# Patient Record
Sex: FEMALE | Race: OTHER | HISPANIC OR LATINO | Employment: OTHER | URBAN - METROPOLITAN AREA
[De-identification: names, ages, dates, MRNs, and addresses within clinical notes are randomized per-mention and may not be internally consistent; named-entity substitution may affect disease eponyms.]

---

## 2018-10-10 ENCOUNTER — TELEPHONE (OUTPATIENT)
Dept: PAIN MEDICINE | Facility: MEDICAL CENTER | Age: 53
End: 2018-10-10

## 2018-10-10 NOTE — TELEPHONE ENCOUNTER
New pt being referred by Dr Tianna Hirsch in [place] for back pain    Pt has no seen prior pain mgt  Had an MRI done (will bring disc)  BC BS    Scheduled with Luis Armando Garcia for 10/29 and new pt pw mailed

## 2018-10-11 ENCOUNTER — OFFICE VISIT (OUTPATIENT)
Dept: URGENT CARE | Facility: MEDICAL CENTER | Age: 53
End: 2018-10-11
Payer: COMMERCIAL

## 2018-10-11 VITALS
DIASTOLIC BLOOD PRESSURE: 67 MMHG | WEIGHT: 143.25 LBS | RESPIRATION RATE: 20 BRPM | SYSTOLIC BLOOD PRESSURE: 107 MMHG | TEMPERATURE: 97.8 F | HEIGHT: 63 IN | BODY MASS INDEX: 25.38 KG/M2 | HEART RATE: 79 BPM

## 2018-10-11 DIAGNOSIS — J02.9 ACUTE PHARYNGITIS, UNSPECIFIED ETIOLOGY: Primary | ICD-10-CM

## 2018-10-11 LAB — S PYO AG THROAT QL: NEGATIVE

## 2018-10-11 PROCEDURE — 99203 OFFICE O/P NEW LOW 30 MIN: CPT | Performed by: PHYSICIAN ASSISTANT

## 2018-10-11 PROCEDURE — 87430 STREP A AG IA: CPT | Performed by: PHYSICIAN ASSISTANT

## 2018-10-11 RX ORDER — ALPRAZOLAM 0.25 MG/1
0.5 TABLET ORAL 2 TIMES DAILY
COMMUNITY
End: 2019-03-06 | Stop reason: SDUPTHER

## 2018-10-11 NOTE — PROGRESS NOTES
[facility]        NAME: Betsy Mccollum is a 52 y o  female  : 1965    MRN: [id number]  DATE: 2018  TIME: 12:17 PM    Assessment and Plan   Acute pharyngitis, unspecified etiology [J02 9]  1  Acute pharyngitis, unspecified etiology  POCT rapid strepA         Patient Instructions     1  Motrin as needed for throat pain  2  Increase fluids  3  Follow up with PCP in 3-5 days if symptoms persist     Chief Complaint     Chief Complaint   Patient presents with    Sore Throat     x2days head congestion   no fevers          History of Present Illness       The patient is a 57-year-old female presents with a 3 day history of nasal congestion, sore throat, swollen glands and headache  She denies any fever, chills or body aches since the onset of her symptoms  Review of Systems   Review of Systems   Constitutional: Negative  HENT: Positive for congestion and sore throat  Respiratory: Negative for cough  Gastrointestinal: Negative  Current Medications       Current Outpatient Prescriptions:     ALPRAZolam (XANAX) 0 25 mg tablet, Take by mouth daily at bedtime as needed for anxiety, Disp: , Rfl:     Current Allergies     Allergies as of 10/11/2018    (No Known Allergies)            The following portions of the patient's history were reviewed and updated as appropriate: allergies, current medications, past family history, past medical history, past social history, past surgical history and problem list      History reviewed  No pertinent past medical history  History reviewed  No pertinent surgical history  History reviewed  No pertinent family history  Medications have been verified  Objective   /67   Pulse 79   Temp 97 8 °F (36 6 °C) (Temporal)   Resp 20   Ht 5' 3" (1 6 m)   Wt 65 kg (143 lb 4 oz)   BMI 25 38 kg/m²        Physical Exam     Physical Exam   Constitutional: She appears well-developed and well-nourished  No distress     HENT: Head: Normocephalic and atraumatic  Right Ear: Tympanic membrane normal    Left Ear: Tympanic membrane normal    Nose: Nose normal    Mouth/Throat: Uvula is midline and mucous membranes are normal  Posterior oropharyngeal erythema present  No oropharyngeal exudate or posterior oropharyngeal edema  Cardiovascular: Normal rate, regular rhythm and normal heart sounds  No murmur heard  Pulmonary/Chest: Effort normal and breath sounds normal    Lymphadenopathy:        Head (right side): Tonsillar adenopathy present  Head (left side): Tonsillar adenopathy present  She has no cervical adenopathy

## 2018-10-11 NOTE — PATIENT INSTRUCTIONS
1  Motrin as needed for throat pain  2  Increase fluids  3   Follow up with PCP in 3-5 days if symptoms persist

## 2018-10-29 ENCOUNTER — CONSULT (OUTPATIENT)
Dept: PAIN MEDICINE | Facility: CLINIC | Age: 53
End: 2018-10-29
Payer: COMMERCIAL

## 2018-10-29 VITALS
SYSTOLIC BLOOD PRESSURE: 110 MMHG | WEIGHT: 148 LBS | HEIGHT: 63 IN | HEART RATE: 80 BPM | DIASTOLIC BLOOD PRESSURE: 68 MMHG | TEMPERATURE: 98 F | BODY MASS INDEX: 26.22 KG/M2

## 2018-10-29 DIAGNOSIS — M25.562 LEFT KNEE PAIN, UNSPECIFIED CHRONICITY: ICD-10-CM

## 2018-10-29 DIAGNOSIS — M48.062 LUMBAR STENOSIS WITH NEUROGENIC CLAUDICATION: ICD-10-CM

## 2018-10-29 DIAGNOSIS — M53.3 COCCYXDYNIA: ICD-10-CM

## 2018-10-29 DIAGNOSIS — M51.16 INTERVERTEBRAL DISC DISORDER WITH RADICULOPATHY OF LUMBAR REGION: Primary | ICD-10-CM

## 2018-10-29 PROCEDURE — 99244 OFF/OP CNSLTJ NEW/EST MOD 40: CPT | Performed by: ANESTHESIOLOGY

## 2018-10-29 NOTE — PROGRESS NOTES
Assessment:  1  Intervertebral disc disorder with radiculopathy of lumbar region    2  Lumbar stenosis with neurogenic claudication    3  Left knee pain, unspecified chronicity    4  Coccyxdynia      Plan:  The patient's symptoms, history/physical are consistent with pain that is multifactorial in origin but predominantly the result of her spinal stenosis at L4-5 which is leading to her lower back and buttock complaints  At this time, I discussed performing bilateral L4 transforaminal epidural steroid injection to help reduce swelling and inflammation which is leading to her pain symptoms  She was apprised of the most common risks and would like to proceed  She will be scheduled for an upcoming Tuesday or Thursday under fluoroscopic guidance  Once she has appropriate pain relief, she would benefit from a course of physical therapy  In addition, I will order a dedicated x-rays of the left knee to evaluate for chronic left knee pain  Complete risks and benefits including bleeding, infection, tissue reaction, nerve injury and allergic reaction were discussed  The approach was demonstrated using models and literature was provided  Verbal and written consent was obtained  My impressions and treatment recommendations were discussed in detail with the patient who verbalized understanding and had no further questions  Discharge instructions were provided  I personally saw and examined the patient and I agree with the above discussed plan of care  Orders Placed This Encounter   Procedures    FL spine and pain procedure     Standing Status:   Future     Standing Expiration Date:   10/29/2022     Order Specific Question:   Reason for Exam:     Answer:   Bilateral L4 TF ANTONIO     Order Specific Question:   Is the patient pregnant? Answer:   No     Order Specific Question:   Anticoagulant hold needed?      Answer:   No    XR knee 3 vw left non injury     Standing Status:   Future     Standing Expiration Date:   10/29/2022     Scheduling Instructions:      Bring along any outside films relating to this procedure  Order Specific Question:   Reason for Exam:     Answer:   left knee pain     Order Specific Question:   Is the patient pregnant? Answer:   No     No orders of the defined types were placed in this encounter  History of Present Illness:    Елена Avila is a 52 y o  female who presents for consultation in regards to tailbone pain, low back pain and knee pain  Symptoms have been present for many years worse over the last 2 years  Pain is reported to be moderate to severe rated 8/10 on numeric rating scale and felt nearly constantly  Symptoms are worst in the morning  Pain is described to be sharp and shooting in the lower back and travels down into the knee  Symptoms are aggravated with lying down, standing, bending, sitting, walking, exercise, relaxation  Symptoms are also aggravated by getting up from a seated position  There is no change with coughing, sneezing or bowel movements  Treatment history has included use of naproxen as needed which provided some mild relief, but caused significant stomach irritation  She has not done any physical therapy  She did try gabapentin but this did not help and caused her weight gain  I have personally reviewed and/or updated the patient's past medical history, past surgical history, family history, social history, current medications, allergies, and vital signs today  Review of Systems:    Review of Systems   Constitutional: Negative for fever and unexpected weight change  HENT: Negative for trouble swallowing  Eyes: Negative for visual disturbance  Respiratory: Negative for shortness of breath and wheezing  Cardiovascular: Negative for chest pain and palpitations  Gastrointestinal: Positive for constipation  Negative for diarrhea, nausea and vomiting     Endocrine: Negative for cold intolerance, heat intolerance and polydipsia  Genitourinary: Negative for difficulty urinating and frequency  Musculoskeletal: Positive for gait problem and joint swelling  Negative for arthralgias and myalgias  Skin: Negative for rash  Neurological: Positive for weakness  Negative for dizziness, seizures, syncope and headaches  Hematological: Does not bruise/bleed easily  Psychiatric/Behavioral: Negative for dysphoric mood  All other systems reviewed and are negative  Patient Active Problem List   Diagnosis    Intervertebral disc disorder with radiculopathy of lumbar region    Lumbar stenosis with neurogenic claudication    Coccyxdynia    Left knee pain       No past medical history on file  No past surgical history on file  No family history on file  Social History     Occupational History    Not on file  Social History Main Topics    Smoking status: Current Every Day Smoker    Smokeless tobacco: Never Used    Alcohol use Not on file    Drug use: No    Sexual activity: Not on file       Current Outpatient Prescriptions on File Prior to Visit   Medication Sig    ALPRAZolam (XANAX) 0 25 mg tablet Take by mouth daily at bedtime as needed for anxiety     No current facility-administered medications on file prior to visit  No Known Allergies    Physical Exam:    /68   Pulse 80   Temp 98 °F (36 7 °C) (Oral)   Ht 5' 3" (1 6 m)   Wt 67 1 kg (148 lb)   BMI 26 22 kg/m²     Constitutional: normal, well developed, well nourished, alert, in no distress and non-toxic and no overt pain behavior    Eyes: anicteric  HEENT: grossly intact  Neck: supple, symmetric, trachea midline and no masses   Pulmonary:even and unlabored  Cardiovascular:No edema or pitting edema present  Skin:Normal without rashes or lesions and well hydrated  Psychiatric:Mood and affect appropriate  Neurologic:Cranial Nerves II-XII grossly intact  Musculoskeletal:normal     Lumbar Spine Exam  Appearance:  Normal lordosis  Palpation/Tenderness:  left lumbar paraspinal tenderness  right lumbar paraspinal tenderness  Positive tailbone pain  Sensory:  no sensory deficits noted  Range of Motion:  Flexion:  No limitation  without pain  Extension:  Moderately limited  with pain  Lateral Flexion - Left:  Minimally limited  with pain  Lateral Flexion - Right:  Minimally limited  with pain  Rotation - Left:  No limitation  without pain  Rotation - Right:  No limitation  without pain  Motor Strength:  Left hip flexion:  5/5  Left hip extension:  5/5  Right hip flexion:  5/5  Right hip extension:  5/5  Left knee flexion:  5/5  Left knee extension:  5/5  Right knee flexion:  5/5  Right knee extension:  5/5  Left foot dorsiflexion:  5/5  Left foot plantar flexion:  5/5  Right foot dorsiflexion:  5/5  Right foot plantar flexion:  5/5  Reflexes:  Left Patellar:  2+   Right Patellar:  2+   Left Achilles:  2+   Right Achilles:  2+   Special Tests:  Left Straight Leg Test:  positive  Right Straight Leg Test:  negative  Left Eliceo's Maneuver:  negative  Right Eliceo's Maneuver:  negative    Imaging    MRI Lumbar Spine (9/7/2018)    L4-5:  Left neural foraminal annular tear  Moderate spinal stenosis at L4-5 with impingement of the L4 nerve roots    Impingement of the L5 nerve roots within the lateral recesses    MRI Coccyx (9/7/2018)    Normal

## 2018-10-31 ENCOUNTER — APPOINTMENT (OUTPATIENT)
Dept: RADIOLOGY | Facility: MEDICAL CENTER | Age: 53
End: 2018-10-31
Payer: COMMERCIAL

## 2018-10-31 ENCOUNTER — TRANSCRIBE ORDERS (OUTPATIENT)
Dept: ADMINISTRATIVE | Facility: HOSPITAL | Age: 53
End: 2018-10-31

## 2018-10-31 ENCOUNTER — APPOINTMENT (OUTPATIENT)
Dept: LAB | Facility: MEDICAL CENTER | Age: 53
End: 2018-10-31
Payer: COMMERCIAL

## 2018-10-31 DIAGNOSIS — E11.9 DIABETES MELLITUS WITHOUT COMPLICATION (HCC): ICD-10-CM

## 2018-10-31 DIAGNOSIS — E87.6 HYPOPOTASSEMIA: ICD-10-CM

## 2018-10-31 DIAGNOSIS — R68.89 MECHANICAL AND MOTOR PROBLEMS WITH INTERNAL ORGANS: Primary | ICD-10-CM

## 2018-10-31 DIAGNOSIS — K83.8 BILE DUCT PROLIFERATION: ICD-10-CM

## 2018-10-31 DIAGNOSIS — E87.1 HYPOSMOLALITY SYNDROME: ICD-10-CM

## 2018-10-31 DIAGNOSIS — E55.9 VITAMIN D DEFICIENCY: ICD-10-CM

## 2018-10-31 DIAGNOSIS — R31.9 HEMATURIA SYNDROME: ICD-10-CM

## 2018-10-31 DIAGNOSIS — E03.9 HYPOTHYROIDISM, UNSPECIFIED TYPE: ICD-10-CM

## 2018-10-31 DIAGNOSIS — M25.562 LEFT KNEE PAIN, UNSPECIFIED CHRONICITY: ICD-10-CM

## 2018-10-31 LAB
25(OH)D3 SERPL-MCNC: 16.6 NG/ML (ref 30–100)
ALT SERPL W P-5'-P-CCNC: 25 U/L (ref 12–78)
ANION GAP SERPL CALCULATED.3IONS-SCNC: 4 MMOL/L (ref 4–13)
AST SERPL W P-5'-P-CCNC: 17 U/L (ref 5–45)
BACTERIA UR QL AUTO: ABNORMAL /HPF
BILIRUB UR QL STRIP: NEGATIVE
BUN SERPL-MCNC: 14 MG/DL (ref 5–25)
CALCIUM SERPL-MCNC: 9 MG/DL (ref 8.3–10.1)
CHLORIDE SERPL-SCNC: 104 MMOL/L (ref 100–108)
CHOLEST SERPL-MCNC: 198 MG/DL (ref 50–200)
CLARITY UR: CLEAR
CO2 SERPL-SCNC: 29 MMOL/L (ref 21–32)
COLOR UR: YELLOW
CREAT SERPL-MCNC: 0.74 MG/DL (ref 0.6–1.3)
ERYTHROCYTE [DISTWIDTH] IN BLOOD BY AUTOMATED COUNT: 12.5 % (ref 11.6–15.1)
EST. AVERAGE GLUCOSE BLD GHB EST-MCNC: 103 MG/DL
GFR SERPL CREATININE-BSD FRML MDRD: 91 ML/MIN/1.73SQ M
GLUCOSE P FAST SERPL-MCNC: 74 MG/DL (ref 65–99)
GLUCOSE UR STRIP-MCNC: NEGATIVE MG/DL
HBA1C MFR BLD: 5.2 % (ref 4.2–6.3)
HCT VFR BLD AUTO: 40.6 % (ref 34.8–46.1)
HDLC SERPL-MCNC: 41 MG/DL (ref 40–60)
HGB BLD-MCNC: 13.8 G/DL (ref 11.5–15.4)
HGB UR QL STRIP.AUTO: ABNORMAL
HYALINE CASTS #/AREA URNS LPF: ABNORMAL /LPF
KETONES UR STRIP-MCNC: NEGATIVE MG/DL
LDLC SERPL CALC-MCNC: 120 MG/DL (ref 0–100)
LDLC SERPL DIRECT ASSAY-MCNC: 132 MG/DL (ref 0–100)
LEUKOCYTE ESTERASE UR QL STRIP: NEGATIVE
MCH RBC QN AUTO: 33.8 PG (ref 26.8–34.3)
MCHC RBC AUTO-ENTMCNC: 34 G/DL (ref 31.4–37.4)
MCV RBC AUTO: 100 FL (ref 82–98)
NITRITE UR QL STRIP: NEGATIVE
NON-SQ EPI CELLS URNS QL MICRO: ABNORMAL /HPF
NONHDLC SERPL-MCNC: 157 MG/DL
PH UR STRIP.AUTO: 6 [PH] (ref 4.5–8)
PLATELET # BLD AUTO: 313 THOUSANDS/UL (ref 149–390)
PMV BLD AUTO: 9.6 FL (ref 8.9–12.7)
POTASSIUM SERPL-SCNC: 4.3 MMOL/L (ref 3.5–5.3)
PROT UR STRIP-MCNC: NEGATIVE MG/DL
RBC # BLD AUTO: 4.08 MILLION/UL (ref 3.81–5.12)
RBC #/AREA URNS AUTO: ABNORMAL /HPF
SODIUM SERPL-SCNC: 137 MMOL/L (ref 136–145)
SP GR UR STRIP.AUTO: 1.02 (ref 1–1.03)
T3 SERPL-MCNC: 1.4 NG/ML (ref 0.6–1.8)
T4 SERPL-MCNC: 8.5 UG/DL (ref 4.7–13.3)
TRIGL SERPL-MCNC: 186 MG/DL
TSH SERPL DL<=0.05 MIU/L-ACNC: 1.43 UIU/ML (ref 0.36–3.74)
UROBILINOGEN UR QL STRIP.AUTO: 0.2 E.U./DL
WBC # BLD AUTO: 7.19 THOUSAND/UL (ref 4.31–10.16)
WBC #/AREA URNS AUTO: ABNORMAL /HPF

## 2018-10-31 PROCEDURE — 83721 ASSAY OF BLOOD LIPOPROTEIN: CPT | Performed by: INTERNAL MEDICINE

## 2018-10-31 PROCEDURE — 85027 COMPLETE CBC AUTOMATED: CPT | Performed by: INTERNAL MEDICINE

## 2018-10-31 PROCEDURE — 80061 LIPID PANEL: CPT | Performed by: INTERNAL MEDICINE

## 2018-10-31 PROCEDURE — 73562 X-RAY EXAM OF KNEE 3: CPT

## 2018-10-31 PROCEDURE — 84460 ALANINE AMINO (ALT) (SGPT): CPT | Performed by: INTERNAL MEDICINE

## 2018-10-31 PROCEDURE — 81001 URINALYSIS AUTO W/SCOPE: CPT | Performed by: INTERNAL MEDICINE

## 2018-10-31 PROCEDURE — 82306 VITAMIN D 25 HYDROXY: CPT | Performed by: INTERNAL MEDICINE

## 2018-10-31 PROCEDURE — 84443 ASSAY THYROID STIM HORMONE: CPT | Performed by: INTERNAL MEDICINE

## 2018-10-31 PROCEDURE — 84450 TRANSFERASE (AST) (SGOT): CPT | Performed by: INTERNAL MEDICINE

## 2018-10-31 PROCEDURE — 84436 ASSAY OF TOTAL THYROXINE: CPT | Performed by: INTERNAL MEDICINE

## 2018-10-31 PROCEDURE — 80048 BASIC METABOLIC PNL TOTAL CA: CPT | Performed by: INTERNAL MEDICINE

## 2018-10-31 PROCEDURE — 83036 HEMOGLOBIN GLYCOSYLATED A1C: CPT | Performed by: INTERNAL MEDICINE

## 2018-10-31 PROCEDURE — 36415 COLL VENOUS BLD VENIPUNCTURE: CPT | Performed by: INTERNAL MEDICINE

## 2018-10-31 PROCEDURE — 84480 ASSAY TRIIODOTHYRONINE (T3): CPT | Performed by: INTERNAL MEDICINE

## 2018-11-13 ENCOUNTER — HOSPITAL ENCOUNTER (OUTPATIENT)
Dept: RADIOLOGY | Facility: CLINIC | Age: 53
Discharge: HOME | End: 2018-11-13
Attending: ANESTHESIOLOGY | Admitting: ANESTHESIOLOGY
Payer: COMMERCIAL

## 2018-11-13 VITALS
RESPIRATION RATE: 18 BRPM | DIASTOLIC BLOOD PRESSURE: 72 MMHG | HEART RATE: 64 BPM | OXYGEN SATURATION: 98 % | TEMPERATURE: 98 F | SYSTOLIC BLOOD PRESSURE: 107 MMHG

## 2018-11-13 DIAGNOSIS — M48.062 LUMBAR STENOSIS WITH NEUROGENIC CLAUDICATION: ICD-10-CM

## 2018-11-13 PROCEDURE — 64483 NJX AA&/STRD TFRM EPI L/S 1: CPT | Performed by: ANESTHESIOLOGY

## 2018-11-13 RX ORDER — BUPIVACAINE HCL/PF 2.5 MG/ML
30 VIAL (ML) INJECTION ONCE
Status: COMPLETED | OUTPATIENT
Start: 2018-11-13 | End: 2018-11-13

## 2018-11-13 RX ORDER — 0.9 % SODIUM CHLORIDE 0.9 %
10 VIAL (ML) INJECTION ONCE
Status: COMPLETED | OUTPATIENT
Start: 2018-11-13 | End: 2018-11-13

## 2018-11-13 RX ORDER — METHYLPREDNISOLONE ACETATE 80 MG/ML
80 INJECTION, SUSPENSION INTRA-ARTICULAR; INTRALESIONAL; INTRAMUSCULAR; PARENTERAL; SOFT TISSUE ONCE
Status: COMPLETED | OUTPATIENT
Start: 2018-11-13 | End: 2018-11-13

## 2018-11-13 RX ADMIN — SODIUM CHLORIDE 4 ML: 9 INJECTION, SOLUTION INTRAMUSCULAR; INTRAVENOUS; SUBCUTANEOUS at 14:41

## 2018-11-13 RX ADMIN — Medication 4 ML: at 14:41

## 2018-11-13 RX ADMIN — METHYLPREDNISOLONE ACETATE 80 MG: 80 INJECTION, SUSPENSION INTRA-ARTICULAR; INTRALESIONAL; INTRAMUSCULAR; PARENTERAL; SOFT TISSUE at 14:43

## 2018-11-13 RX ADMIN — Medication 2 ML: at 14:43

## 2018-11-13 RX ADMIN — IOHEXOL 1 ML: 300 INJECTION, SOLUTION INTRAVENOUS at 14:43

## 2018-11-13 NOTE — DISCHARGE INSTR - LAB
Epidural Steroid Injection   WHAT YOU NEED TO KNOW:   An epidural steroid injection (ANTONIO) is a procedure to inject steroid medicine into the epidural space  The epidural space is between your spinal cord and vertebrae  Steroids reduce inflammation and fluid buildup in your spine that may be causing pain  You may be given pain medicine along with the steroids  ACTIVITY  · Do not drive or operate machinery today  · No strenuous activity today - bending, lifting, etc   · You may resume normal activites starting tomorrow - start slowly and as tolerated  · You may shower today, but no tub baths or hot tubs  · You may have numbness for several hours from the local anesthetic  Please use caution and common sense, especially with weight-bearing activities  CARE OF THE INJECTION SITE  · If you have soreness or pain, apply ice to the area today (20 minutes on/20 minutes off)  · Starting tomorrow, you may use warm, moist heat or ice if needed  · You may have an increase or change in your discomfort for 36-48 hours after your treatment  · Apply ice and continue with any pain medication you have been prescribed  · Notify [facility] if you have any of the following: redness, drainage, swelling, headache, stiff neck or fever above 100°F     SPECIAL INSTRUCTIONS  · Our office will contact you in approximately 7 days for a progress report  MEDICATIONS  · Continue to take all routine medications  · Our office may have instructed you to hold some medications  If you have a problem specifically related to your procedure, please call our office at [phone]  Problems not related to your procedure should be directed to your primary care physician

## 2018-11-13 NOTE — H&P
History of Present Illness: The patient is a 52 y o  female who presents with complaints of lower back pain secondary to spinal stenosis and is here today for bilateral L4 transforaminal epidural steroid injection  Patient Active Problem List   Diagnosis    Intervertebral disc disorder with radiculopathy of lumbar region    Lumbar stenosis with neurogenic claudication    Coccyxdynia    Left knee pain       No past medical history on file  No past surgical history on file  Current Outpatient Prescriptions:     ALPRAZolam (XANAX) 0 25 mg tablet, Take by mouth daily at bedtime as needed for anxiety, Disp: , Rfl:     No Known Allergies    Physical Exam:   Vitals:    11/13/18 1425   BP: 104/69   Pulse: 66   Resp: 20   Temp: 98 °F (36 7 °C)   SpO2: 98%     General: Awake, Alert, Oriented x 3, Mood and affect appropriate  Respiratory: Respirations even and unlabored  Cardiovascular: Peripheral pulses intact; no edema  Musculoskeletal Exam:   Lower back tenderness    ASA Score: 2    Patient/Chart Verification  Patient ID Verified: Verbal  Consents Confirmed: Procedural, To be obtained in the Pre-Procedure area  H&P( within 30 days) Verified: To be obtained in the Pre-Procedure area  Allergies Reviewed: Yes  Anticoag/NSAID held?: NA  Currently on antibiotics?: No  Pregnancy denied?: No    Assessment:   1   Lumbar stenosis with neurogenic claudication        Plan: Bilateral L4 TF ANTONIO

## 2018-11-21 ENCOUNTER — TELEPHONE (OUTPATIENT)
Dept: OBGYN CLINIC | Facility: HOSPITAL | Age: 53
End: 2018-11-21

## 2018-12-05 NOTE — TELEPHONE ENCOUNTER
Pt received CNR letter  States at least 50% relief  The pain is bearable now and she can function much better  The pain in her tailbone has eased a little bit - she was hoping for more relief in that area  Her current pain level is about a 5  Pt would like a call back to advise on next step  She can be reached at [phone]

## 2018-12-20 ENCOUNTER — HOSPITAL ENCOUNTER (OUTPATIENT)
Dept: RADIOLOGY | Facility: CLINIC | Age: 53
Discharge: HOME | End: 2018-12-20
Attending: ANESTHESIOLOGY | Admitting: ANESTHESIOLOGY
Payer: COMMERCIAL

## 2018-12-20 VITALS
RESPIRATION RATE: 20 BRPM | TEMPERATURE: 99 F | OXYGEN SATURATION: 99 % | DIASTOLIC BLOOD PRESSURE: 67 MMHG | SYSTOLIC BLOOD PRESSURE: 97 MMHG | HEART RATE: 80 BPM

## 2018-12-20 DIAGNOSIS — M53.3 COCCYGODYNIA: ICD-10-CM

## 2018-12-20 PROCEDURE — 77002 NEEDLE LOCALIZATION BY XRAY: CPT | Performed by: ANESTHESIOLOGY

## 2018-12-20 PROCEDURE — 20605 DRAIN/INJ JOINT/BURSA W/O US: CPT | Performed by: ANESTHESIOLOGY

## 2018-12-20 RX ORDER — BUPIVACAINE HCL/PF 2.5 MG/ML
30 VIAL (ML) INJECTION ONCE
Status: COMPLETED | OUTPATIENT
Start: 2018-12-20 | End: 2018-12-20

## 2018-12-20 RX ORDER — METHYLPREDNISOLONE ACETATE 80 MG/ML
80 INJECTION, SUSPENSION INTRA-ARTICULAR; INTRALESIONAL; INTRAMUSCULAR; PARENTERAL; SOFT TISSUE ONCE
Status: COMPLETED | OUTPATIENT
Start: 2018-12-20 | End: 2018-12-20

## 2018-12-20 RX ORDER — 0.9 % SODIUM CHLORIDE 0.9 %
10 VIAL (ML) INJECTION ONCE
Status: COMPLETED | OUTPATIENT
Start: 2018-12-20 | End: 2018-12-20

## 2018-12-20 RX ADMIN — Medication 3 ML: at 14:13

## 2018-12-20 RX ADMIN — SODIUM CHLORIDE 2 ML: 9 INJECTION, SOLUTION INTRAMUSCULAR; INTRAVENOUS; SUBCUTANEOUS at 14:11

## 2018-12-20 RX ADMIN — Medication 2 ML: at 14:11

## 2018-12-20 RX ADMIN — IOHEXOL 1 ML: 300 INJECTION, SOLUTION INTRAVENOUS at 14:12

## 2018-12-20 RX ADMIN — METHYLPREDNISOLONE ACETATE 80 MG: 80 INJECTION, SUSPENSION INTRA-ARTICULAR; INTRALESIONAL; INTRAMUSCULAR; PARENTERAL; SOFT TISSUE at 14:13

## 2018-12-20 NOTE — DISCHARGE INSTR - LAB
1  Do not apply heat to any area that is numb  If you have discomfort or soreness at the injection site, you may apply ice today, 20 minutes on and 20 minutes off  Tomorrow you may use ice or warm, moist heat  Do not apply ice or heat directly to the skin  2  If you experience severe shortness of breath, go to the Emergency Room  3  You may have numbness for several hours from the local anesthetic  Please use caution and common sense, especially with weight-bearing activities  4  You may have an increase or change in the discomfort for 36-48 hours after your treatment  Apply ice and continue with any pain medicine you have been prescribed  5  Do not do anything strenuous today  You may shower, but no tub baths or hot tubs today  You may resume your normal activities tomorrow, but do not overdo it  Resume normal activities slowly when you are feeling better  6  If you experience redness, drainage or swelling at the injection site, or if you develop a fever above 100 degrees, please call [facility] at [phone] or go to the Emergency Room  7  Continue to take all routine medicines prescribed by your primary care physician unless otherwise instructed by our staff  Most blood thinners should be started again according to your regularly scheduled dosing  If you have any questions, please give our office a call  If you have a problem specifically related to your procedure, please call our office at [phone]  Problems not related to your procedure should be directed to your primary care physician

## 2018-12-20 NOTE — H&P
History of Present Illness: The patient is a 52 y o  female who presents with complaints of tailbone pain secondary to coccydynia and is here today for sacrococcygeal ligament injection  Patient Active Problem List   Diagnosis    Intervertebral disc disorder with radiculopathy of lumbar region    Lumbar stenosis with neurogenic claudication    Coccyxdynia    Left knee pain       No past medical history on file  No past surgical history on file  Current Outpatient Prescriptions:     ALPRAZolam (XANAX) 0 25 mg tablet, Take by mouth daily at bedtime as needed for anxiety, Disp: , Rfl:     No Known Allergies    Physical Exam:   Vitals:    12/20/18 1357   BP: 101/67   Pulse: 82   Resp: 20   Temp: 99 °F (37 2 °C)   SpO2: 97%     General: Awake, Alert, Oriented x 3, Mood and affect appropriate  Respiratory: Respirations even and unlabored  Cardiovascular: Peripheral pulses intact; no edema  Musculoskeletal Exam:   Tailbone tenderness    ASA Score: 1    Patient/Chart Verification  Patient ID Verified: Verbal  Consents Confirmed: Procedural, To be obtained in the Pre-Procedure area  Allergies Reviewed: Yes  Anticoag/NSAID held?: NA  Currently on antibiotics?: No    Assessment:   1   Coccygodynia        Plan: Sacrococcygeal Ligament Injection

## 2018-12-27 ENCOUNTER — TELEPHONE (OUTPATIENT)
Dept: PAIN MEDICINE | Facility: CLINIC | Age: 53
End: 2018-12-27

## 2019-01-21 NOTE — TELEPHONE ENCOUNTER
S/w pt  States pain has improved since she last s/w SPA  Rates pain a 3 today and is manageable  Tailbone pain is improved and lower back pain is worse in the AM but subsides throughout the day  No f/u scheduled  When would you like to see pt back?

## 2019-02-22 ENCOUNTER — OFFICE VISIT (OUTPATIENT)
Dept: PAIN MEDICINE | Facility: CLINIC | Age: 54
End: 2019-02-22
Payer: COMMERCIAL

## 2019-02-22 VITALS
BODY MASS INDEX: 26.22 KG/M2 | HEART RATE: 85 BPM | SYSTOLIC BLOOD PRESSURE: 110 MMHG | WEIGHT: 148 LBS | DIASTOLIC BLOOD PRESSURE: 74 MMHG | TEMPERATURE: 98.2 F

## 2019-02-22 DIAGNOSIS — M48.062 LUMBAR STENOSIS WITH NEUROGENIC CLAUDICATION: ICD-10-CM

## 2019-02-22 DIAGNOSIS — M53.3 COCCYXDYNIA: ICD-10-CM

## 2019-02-22 DIAGNOSIS — M51.16 INTERVERTEBRAL DISC DISORDER WITH RADICULOPATHY OF LUMBAR REGION: Primary | ICD-10-CM

## 2019-02-22 PROCEDURE — 99214 OFFICE O/P EST MOD 30 MIN: CPT | Performed by: ANESTHESIOLOGY

## 2019-02-22 RX ORDER — MELOXICAM 15 MG/1
15 TABLET ORAL DAILY
Qty: 30 TABLET | Refills: 1 | Status: SHIPPED | OUTPATIENT
Start: 2019-02-22

## 2019-02-22 NOTE — PATIENT INSTRUCTIONS

## 2019-02-22 NOTE — PROGRESS NOTES
Assessment:  1  Intervertebral disc disorder with radiculopathy of lumbar region    2  Lumbar stenosis with neurogenic claudication    3  Coccyxdynia        Plan:  The patient has ongoing pain in lower back likely from her spinal stenosis  Overall symptoms are better from the epidural steroid injection  At this time, I will have her start meloxicam 15 mg daily to be taken in the evening with dinner time  I will also give her some exercises for core and back strengthening that should help alleviate some of the back symptoms that she experiences  She will call with an update in 2 weeks on how she is feeling  My impressions and treatment recommendations were discussed in detail with the patient who verbalized understanding and had no further questions  Discharge instructions were provided  I personally saw and examined the patient and I agree with the above discussed plan of care  No orders of the defined types were placed in this encounter  New Medications Ordered This Visit   Medications    meloxicam (MOBIC) 15 mg tablet     Sig: Take 1 tablet (15 mg total) by mouth daily     Dispense:  30 tablet     Refill:  1       History of Present Illness:  Shima Loaiza is a 62 y o  female who presents for a follow up office visit in regards to Back Pain (injection at the end of 2018  follow-up visit )  The patient has a history of lumbar disc disorder with radiculopathy and lumbar spinal stenosis and returns for follow-up  She is status post bilateral L4 transforaminal epidural steroid injection in November followed by sacrococcygeal injection in December  She reports intermittent pain in the lower back that is sharp in nature worst in the morning  I have personally reviewed and/or updated the patient's past medical history, past surgical history, family history, social history, current medications, allergies, and vital signs today  Review of Systems   Respiratory: Negative for shortness of breath  Cardiovascular: Negative for chest pain  Gastrointestinal: Positive for constipation  Negative for diarrhea, nausea and vomiting  Musculoskeletal: Positive for gait problem  Negative for arthralgias, joint swelling and myalgias  Skin: Negative for rash  Neurological: Negative for dizziness, seizures and weakness  All other systems reviewed and are negative  Patient Active Problem List   Diagnosis    Intervertebral disc disorder with radiculopathy of lumbar region    Lumbar stenosis with neurogenic claudication    Coccyxdynia    Left knee pain       No past medical history on file  No past surgical history on file  No family history on file  Social History     Occupational History    Not on file   Tobacco Use    Smoking status: Current Every Day Smoker    Smokeless tobacco: Never Used   Substance and Sexual Activity    Alcohol use: Not on file    Drug use: No    Sexual activity: Not on file       Current Outpatient Medications on File Prior to Visit   Medication Sig    ALPRAZolam (XANAX) 0 25 mg tablet Take by mouth daily at bedtime as needed for anxiety     No current facility-administered medications on file prior to visit  No Known Allergies    Physical Exam:    /74   Pulse 85   Temp 98 2 °F (36 8 °C) (Oral)   Wt 67 1 kg (148 lb)   BMI 26 22 kg/m²     Constitutional:normal, well developed, well nourished, alert, in no distress and non-toxic and no overt pain behavior    Eyes:anicteric  HEENT:grossly intact  Neck:supple, symmetric, trachea midline and no masses   Pulmonary:even and unlabored  Cardiovascular:No edema or pitting edema present  Skin:Normal without rashes or lesions and well hydrated  Psychiatric:Mood and affect appropriate  Neurologic:Cranial Nerves II-XII grossly intact  Musculoskeletal:normal     Lumbar Spine Exam  Appearance:  Normal lordosis  Palpation/Tenderness:  left lumbar paraspinal tenderness  right lumbar paraspinal tenderness  Sensory:  no sensory deficits noted  Range of Motion:  Flexion:  No limitation  without pain  Extension:  Moderately limited  with pain  Lateral Flexion - Left:  Minimally limited  with pain  Lateral Flexion - Right:  Minimally limited  with pain  Rotation - Left:  No limitation  without pain  Rotation - Right:  No limitation  without pain  Motor Strength:  Left hip flexion:  5/5  Left hip extension:  5/5  Right hip flexion:  5/5  Right hip extension:  5/5  Left knee flexion:  5/5  Left knee extension:  5/5  Right knee flexion:  5/5  Right knee extension:  5/5  Left foot dorsiflexion:  5/5  Left foot plantar flexion:  5/5  Right foot dorsiflexion:  5/5  Right foot plantar flexion:  5/5  Reflexes:  Left Patellar:  2+   Right Patellar:  2+   Left Achilles:  2+   Right Achilles:  2+     Imaging    MRI Lumbar Spine (9/7/2018)     L4-5:  Left neural foraminal annular tear  Moderate spinal stenosis at L4-5 with impingement of the L4 nerve roots    Impingement of the L5 nerve roots within the lateral recesses

## 2019-03-06 ENCOUNTER — OFFICE VISIT (OUTPATIENT)
Dept: INTERNAL MEDICINE CLINIC | Facility: CLINIC | Age: 54
End: 2019-03-06
Payer: COMMERCIAL

## 2019-03-06 VITALS
DIASTOLIC BLOOD PRESSURE: 66 MMHG | BODY MASS INDEX: 23.9 KG/M2 | SYSTOLIC BLOOD PRESSURE: 102 MMHG | OXYGEN SATURATION: 97 % | HEART RATE: 66 BPM | WEIGHT: 140 LBS | HEIGHT: 64 IN | RESPIRATION RATE: 16 BRPM | TEMPERATURE: 97 F

## 2019-03-06 DIAGNOSIS — K59.09 OTHER CONSTIPATION: ICD-10-CM

## 2019-03-06 DIAGNOSIS — E55.9 VITAMIN D DEFICIENCY: ICD-10-CM

## 2019-03-06 DIAGNOSIS — Z12.11 SCREENING FOR COLON CANCER: ICD-10-CM

## 2019-03-06 DIAGNOSIS — Z72.0 TOBACCO ABUSE: ICD-10-CM

## 2019-03-06 DIAGNOSIS — F41.9 ANXIETY: ICD-10-CM

## 2019-03-06 DIAGNOSIS — M51.16 INTERVERTEBRAL DISC DISORDER WITH RADICULOPATHY OF LUMBAR REGION: ICD-10-CM

## 2019-03-06 DIAGNOSIS — Z71.9 HEALTH COUNSELING: ICD-10-CM

## 2019-03-06 DIAGNOSIS — E78.49 OTHER HYPERLIPIDEMIA: ICD-10-CM

## 2019-03-06 DIAGNOSIS — R10.32 LEFT LOWER QUADRANT PAIN: Primary | ICD-10-CM

## 2019-03-06 DIAGNOSIS — Z00.00 HEALTH MAINTENANCE EXAMINATION: ICD-10-CM

## 2019-03-06 PROCEDURE — 99204 OFFICE O/P NEW MOD 45 MIN: CPT | Performed by: INTERNAL MEDICINE

## 2019-03-06 PROCEDURE — 3008F BODY MASS INDEX DOCD: CPT | Performed by: INTERNAL MEDICINE

## 2019-03-06 RX ORDER — ACETAMINOPHEN 160 MG
2000 TABLET,DISINTEGRATING ORAL DAILY
Qty: 90 CAPSULE | Refills: 1
Start: 2019-03-06

## 2019-03-06 RX ORDER — TRAZODONE HYDROCHLORIDE 50 MG/1
50 TABLET ORAL
Qty: 30 TABLET | Refills: 1 | Status: SHIPPED | OUTPATIENT
Start: 2019-03-06

## 2019-03-06 RX ORDER — ALPRAZOLAM 0.25 MG/1
0.25 TABLET ORAL DAILY PRN
Qty: 30 TABLET | Refills: 0 | Status: SHIPPED | OUTPATIENT
Start: 2019-03-06

## 2019-03-06 NOTE — ASSESSMENT & PLAN NOTE
Discussed use of alprazolam   Trial of trazodone since symptoms mostly related to sleep, may take alprazolam 1/2 tablet qHS or prn  No info on PDMP

## 2019-03-06 NOTE — PATIENT INSTRUCTIONS
Start using the nicotine patch  High Fiber Diet   WHAT YOU NEED TO KNOW:   A high-fiber diet includes foods that have a high amount of fiber  Fiber is the part of fruits, vegetables, and grains that is not broken down by your body  Fiber keeps your bowel movements regular  Fiber can also help lower your cholesterol level, control blood sugar in people with diabetes, and relieve constipation  Fiber can also help you control your weight because it helps you feel full faster  Most adults should eat 25 to 35 grams of fiber each day  Talk to your dietitian or healthcare provider about the amount of fiber you need  DISCHARGE INSTRUCTIONS:   Good sources of fiber:   · Foods with at least 4 grams of fiber per serving:      ¨ ? to ½ cup of high-fiber cereal (check the nutrition label on the box)    ¨ ½ cup of blackberries or raspberries    ¨ 4 dried prunes    ¨ 1 cooked artichoke    ¨ ½ cup of cooked legumes, such as lentils, or red, kidney, and martinez beans    · Foods with 1 to 3 grams of fiber per serving:      ¨ 1 slice of whole-wheat, pumpernickel, or rye bread    ¨ ½ cup of cooked brown rice    ¨ 4 whole-wheat crackers    ¨ 1 cup of oatmeal    ¨ ½ cup of cereal with 1 to 3 grams of fiber per serving (check the nutrition label on the box)    ¨ 1 small piece of fruit, such as an apple, banana, pear, kiwi, or orange    ¨ 3 dates    ¨ ½ cup of canned apricots, fruit cocktail, peaches, or pears    ¨ ½ cup of raw or cooked vegetables, such as carrots, cauliflower, cabbage, spinach, squash, or corn  Ways that you can increase fiber in your diet:   · Choose brown or wild rice instead of white rice  · Use whole wheat flour in recipes instead of white or all-purpose flour  · Add beans and peas to casseroles or soups  · Choose fresh fruit and vegetables with peels or skins on instead of juices  Other diet guidelines to follow:   · Add fiber to your diet slowly    You may have abdominal discomfort, bloating, and gas if you add fiber to your diet too quickly  Drink plenty of liquids as you add fiber to your diet  You may have nausea or develop constipation if you do not drink enough water  Ask how much liquid to drink each day and which liquids are best for you  Heart Healthy Diet   WHAT YOU NEED TO KNOW:   A heart healthy diet is an eating plan low in total fat, unhealthy fats, and sodium (salt)  A heart healthy diet helps decrease your risk for heart disease and stroke  Limit the amount of fat you eat to 25% to 35% of your total daily calories  Limit sodium to less than 2,300 mg each day  DISCHARGE INSTRUCTIONS:   Healthy fats:  Healthy fats can help improve cholesterol levels  The risk for heart disease is decreased when cholesterol levels are normal  Choose healthy fats, such as the following:  · Unsaturated fat  is found in foods such as soybean, canola, olive, corn, and safflower oils  It is also found in soft tub margarine that is made with liquid vegetable oil  · Omega-3 fat  is found in certain fish, such as salmon, tuna, and trout, and in walnuts and flaxseed  Unhealthy fats:  Unhealthy fats can cause unhealthy cholesterol levels in your blood and increase your risk of heart disease  Limit unhealthy fats, such as the following:  · Cholesterol  is found in animal foods, such as eggs and lobster, and in dairy products made from whole milk  Limit cholesterol to less than 300 milligrams (mg) each day  You may need to limit cholesterol to 200 mg each day if you have heart disease  · Saturated fat  is found in meats, such as barrett and hamburger  It is also found in chicken or turkey skin, whole milk, and butter  Limit saturated fat to less than 7% of your total daily calories  Limit saturated fat to less than 6% if you have heart disease or are at increased risk for it  · Trans fat  is found in packaged foods, such as potato chips and cookies   It is also in hard margarine, some fried foods, and shortening  Avoid trans fats as much as possible    Heart healthy foods and drinks to include:  Ask your dietitian or healthcare provider how many servings to have from each of the following food groups:  · Grains:      ¨ Whole-wheat breads, cereals, and pastas, and brown rice    ¨ Low-fat, low-sodium crackers and chips    · Vegetables:      ¨ Broccoli, green beans, green peas, and spinach    ¨ Collards, kale, and lima beans    ¨ Carrots, sweet potatoes, tomatoes, and peppers    ¨ Canned vegetables with no salt added    · Fruits:      ¨ Bananas, peaches, pears, and pineapple    ¨ Grapes, raisins, and dates    ¨ Oranges, tangerines, grapefruit, orange juice, and grapefruit juice    ¨ Apricots, mangoes, melons, and papaya    ¨ Raspberries and strawberries    ¨ Canned fruit with no added sugar    · Low-fat dairy products:      ¨ Nonfat (skim) milk, 1% milk, and low-fat almond, cashew, or soy milks fortified with calcium    ¨ Low-fat cheese, regular or frozen yogurt, and cottage cheese    · Meats and proteins , such as lean cuts of beef and pork (loin, leg, round), skinless chicken and turkey, legumes, soy products, egg whites, and nuts  Foods and drinks to limit or avoid:  Ask your dietitian or healthcare provider about these and other foods that are high in unhealthy fat, sodium, and sugar:  · Snack or packaged foods , such as frozen dinners, cookies, macaroni and cheese, and cereals with more than 300 mg of sodium per serving    · Canned or dry mixes  for cakes, soups, sauces, or gravies    · Vegetables with added sodium , such as instant potatoes, vegetables with added sauces, or regular canned vegetables    · Other foods high in sodium , such as ketchup, barbecue sauce, salad dressing, pickles, olives, soy sauce, and miso    · High-fat dairy foods  such as whole or 2% milk, cream cheese, or sour cream, and cheeses     · High-fat protein foods  such as high-fat cuts of beef (T-bone steaks, ribs), chicken or turkey with skin, and organ meats, such as liver    · Cured or smoked meats , such as hot dogs, barrett, and sausage    · Unhealthy fats and oils , such as butter, stick margarine, shortening, and cooking oils such as coconut or palm oil    · Food and drinks high in sugar , such as soft drinks (soda), sports drinks, sweetened tea, candy, cake, cookies, pies, and doughnuts  Other diet guidelines to follow:   · Eat more foods containing omega-3 fats  Eat fish high in omega-3 fats at least 2 times a week  · Limit alcohol  Too much alcohol can damage your heart and raise your blood pressure  Women should limit alcohol to 1 drink a day  Men should limit alcohol to 2 drinks a day  A drink of alcohol is 12 ounces of beer, 5 ounces of wine, or 1½ ounces of liquor  · Choose low-sodium foods  High-sodium foods can lead to high blood pressure  Add little or no salt to food you prepare  Use herbs and spices in place of salt  · Eat more fiber  to help lower cholesterol levels  Eat at least 5 servings of fruits and vegetables each day  Eat 3 ounces of whole-grain foods each day  Legumes (beans) are also a good source of fiber  Lifestyle guidelines:   · Do not smoke  Nicotine and other chemicals in cigarettes and cigars can cause lung and heart damage  Ask your healthcare provider for information if you currently smoke and need help to quit  E-cigarettes or smokeless tobacco still contain nicotine  Talk to your healthcare provider before you use these products  · Exercise regularly  to help you maintain a healthy weight and improve your blood pressure and cholesterol levels  Ask your healthcare provider about the best exercise plan for you  Do not start an exercise program without asking your healthcare provider  Follow up with your healthcare provider as directed:  Write down your questions so you remember to ask them during your visits     © 2017 [employer] Information is for End User's use only and may not be sold, redistributed or otherwise used for commercial purposes  All illustrations and images included in CareNotes® are the copyrighted property of A [employer]  or [employer]ango  The above information is an  only  It is not intended as medical advice for individual conditions or treatments  Talk to your doctor, nurse or pharmacist before following any medical regimen to see if it is safe and effective for you

## 2019-03-06 NOTE — ASSESSMENT & PLAN NOTE
Discussed smoking cessation  She will try the patch again with Vape (no nicotine)  Agrees to do lung CT screen

## 2019-03-06 NOTE — PROGRESS NOTES
Assessment/Plan:    Anxiety  Discussed use of alprazolam   Trial of trazodone since symptoms mostly related to sleep, may take alprazolam 1/2 tablet qHS or prn  No info on PDMP  Other constipation  Discussed high fiber diet  Normal TSH  Intervertebral disc disorder with radiculopathy of lumbar region  S/p injection, on meloxicam   Per pain  Tobacco abuse  Discussed smoking cessation  She will try the patch again with Vape (no nicotine)  Agrees to do lung CT screen  Vitamin D deficiency  Start daily D3  Other hyperlipidemia  Discussed low fat diet  Diagnoses and all orders for this visit:    Left lower quadrant pain  Comments:  Discussed high fiber diet, increase daily fluid intake  Refer to GI, due for colonoscopy  Intervertebral disc disorder with radiculopathy of lumbar region    Health maintenance examination  -     Ambulatory referral to Obstetrics / Gynecology; Future    Screening for colon cancer  -     Ambulatory referral to Gastroenterology; Future    Anxiety  -     traZODone (DESYREL) 50 mg tablet; Take 1 tablet (50 mg total) by mouth daily at bedtime  -     ALPRAZolam (XANAX) 0 25 mg tablet; Take 1 tablet (0 25 mg total) by mouth daily as needed for anxiety    Other constipation  -     Ambulatory referral to Gastroenterology; Future  -     Comprehensive metabolic panel; Future    Tobacco abuse  -     CT lung screening program; Future    Vitamin D deficiency  -     Vitamin D 25 hydroxy; Future  -     Cholecalciferol (VITAMIN D3) 2000 units capsule; Take 1 capsule (2,000 Units total) by mouth daily    Other hyperlipidemia  -     CBC and differential; Future  -     Comprehensive metabolic panel; Future  -     Lipid panel; Future  -     TSH, 3rd generation with Free T4 reflex; Future    Health counseling  Comments:  Declined flu vaccine  Recommend Pneumovax  Due for eye exam, dentist, PAPs and colonoscopy  Mammogram updated      Other orders  -     ALPHA LIPOIC ACID PO; Take 600 mg by mouth daily      Follow up in 4 months or as needed  Subjective:      Patient ID: Cy Garcia is a 62 y o  female  Mrs Cullen Board is here to establish care  She moved from [place]  She  has a few concerns  First, she has been seen morning pain for low back pain  She received an injection recently, felt it has helped somewhat  She started taking meloxicam daily  Second, she has been taking alprazolam every night to help her sleep  She would take an additional pill during the day as needed for anxiety  She started taking this about a year ago, initially as needed, now needing it regularly  She has not tried other over-the-counter medications or prescription medications  She denies feeling depressed  Third, she would like to try and quit smoking  Her previous PCP gave her nicotine patches which she has not been using  She also used VPAP which seemed to help  She is currently smoking half a pack daily  Fourth, she complains of intermittent left lower quadrant pain  She complains of intermittent constipation, takes a stool softener once in a while  She does not eat enough fiber, does not drink enough water during the day  She denies any nausea or vomiting, no abdominal cramping  Fifth, her cardiologist ordered some blood work for her which she did a few months ago  She has no history of high blood pressure, heart disease  She recalls doing an ultrasound for her carotid arteries which was normal     Lastly, she diagnosed herself with burning mouth syndrome  She changed her toothpaste and started taking alpha lipoic acid and symptoms have resolved  The following portions of the patient's history were reviewed and updated as appropriate: allergies, current medications, past family history, past medical history, past social history, past surgical history and problem list     Past Medical History:   Diagnosis Date    Anxiety      History reviewed   No pertinent surgical history    Family History   Problem Relation Age of Onset    Depression Sister 48    Stroke Sister     Breast cancer Sister 39        breast     Social History     Socioeconomic History    Marital status: /Civil Union     Spouse name: Not on file    Number of children: Not on file    Years of education: Not on file    Highest education level: Not on file   Occupational History    Not on file   Social Needs    Financial resource strain: Not on file    Food insecurity:     Worry: Not on file     Inability: Not on file    Transportation needs:     Medical: Not on file     Non-medical: Not on file   Tobacco Use    Smoking status: Current Every Day Smoker     Packs/day: 1 50     Years: 40 00     Pack years: 60 00    Smokeless tobacco: Never Used   Substance and Sexual Activity    Alcohol use: Not on file    Drug use: No    Sexual activity: Not on file   Lifestyle    Physical activity:     Days per week: Not on file     Minutes per session: Not on file    Stress: Not on file   Relationships    Social connections:     Talks on phone: Not on file     Gets together: Not on file     Attends Confucianism service: Not on file     Active member of club or organization: Not on file     Attends meetings of clubs or organizations: Not on file     Relationship status: Not on file    Intimate partner violence:     Fear of current or ex partner: Not on file     Emotionally abused: Not on file     Physically abused: Not on file     Forced sexual activity: Not on file   Other Topics Concern    Not on file   Social History Narrative    Drinks coffee/tea 2 cups daily        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[place], [place]       Current Outpatient Medications:     ALPHA LIPOIC ACID PO, Take 600 mg by mouth daily, Disp: , Rfl:     ALPRAZolam (XANAX) 0 25 mg tablet, Take 1 tablet (0 25 mg total) by mouth daily as needed for anxiety, Disp: 30 tablet, Rfl: 0    meloxicam (MOBIC) 15 mg tablet, Take 1 tablet (15 mg total) by mouth daily, Disp: 30 tablet, Rfl: 1    Cholecalciferol (VITAMIN D3) 2000 units capsule, Take 1 capsule (2,000 Units total) by mouth daily, Disp: 90 capsule, Rfl: 1    traZODone (DESYREL) 50 mg tablet, Take 1 tablet (50 mg total) by mouth daily at bedtime, Disp: 30 tablet, Rfl: 1  No Known Allergies      Review of Systems   Constitutional: Negative for appetite change and fatigue  HENT: Negative for congestion, ear pain and postnasal drip  Eyes: Negative for visual disturbance  Respiratory: Negative for cough and shortness of breath  Cardiovascular: Negative for chest pain and leg swelling  Gastrointestinal: Negative for abdominal pain, constipation and diarrhea  Genitourinary: Negative for dysuria, frequency and urgency  Musculoskeletal: Negative for arthralgias and myalgias  Skin: Negative for rash and wound  Neurological: Negative for dizziness, numbness and headaches  Hematological: Does not bruise/bleed easily  Psychiatric/Behavioral: Negative for confusion  The patient is not nervous/anxious  Objective:      /66   Pulse 66   Temp (!) 97 °F (36 1 °C)   Resp 16   Ht 5' 4" (1 626 m)   Wt 63 5 kg (140 lb)   SpO2 97%   BMI 24 03 kg/m²          Physical Exam   Constitutional: She is oriented to person, place, and time  Vital signs are normal  She appears well-developed and well-nourished  HENT:   Head: Normocephalic and atraumatic  Right Ear: Tympanic membrane and external ear normal    Left Ear: Tympanic membrane and external ear normal    Nose: Nose normal    Mouth/Throat: Uvula is midline, oropharynx is clear and moist and mucous membranes are normal    Eyes: Pupils are equal, round, and reactive to light  Conjunctivae are normal    Neck: Neck supple  No thyroid mass present  Cardiovascular: Normal rate, regular rhythm and normal heart sounds  Pulmonary/Chest: Effort normal and breath sounds normal  She has no wheezes   She has no rhonchi  Abdominal: Soft  Bowel sounds are normal  There is no tenderness  No hernia  Musculoskeletal: Normal range of motion  She exhibits no edema  No joint pain or swelling   Lymphadenopathy:     She has no cervical adenopathy  Right: No supraclavicular adenopathy present  Left: No supraclavicular adenopathy present  Neurological: She is alert and oriented to person, place, and time  No cranial nerve deficit  Skin: Skin is warm  No rash noted  Psychiatric: She has a normal mood and affect  Her behavior is normal    Nursing note and vitals reviewed  Lab &/or imaging results reviewed with patient

## 2019-03-13 ENCOUNTER — HOSPITAL ENCOUNTER (OUTPATIENT)
Dept: RADIOLOGY | Facility: MEDICAL CENTER | Age: 54
Discharge: HOME | End: 2019-03-13
Payer: COMMERCIAL

## 2019-03-13 DIAGNOSIS — Z72.0 TOBACCO ABUSE: ICD-10-CM

## 2019-03-19 ENCOUNTER — TELEPHONE (OUTPATIENT)
Dept: INTERNAL MEDICINE CLINIC | Facility: CLINIC | Age: 54
End: 2019-03-19

## 2019-03-20 NOTE — TELEPHONE ENCOUNTER
Patient notified  Patient states she feels worried about the results from the CT scan  Would like to if this is a concern at all

## 2019-03-20 NOTE — TELEPHONE ENCOUNTER
Patient notified and would like to know if you recommend her seeing pulmonary(only wants to if absolutely necessary)? If so would like referral  If not will just follow up with CT next year

## 2019-03-20 NOTE — TELEPHONE ENCOUNTER
They are small nodules in the lungs, we will monitor them  Work on smoking cessation  I can refer you to Pulmonary if you'd like

## 2019-03-20 NOTE — TELEPHONE ENCOUNTER
Would recommend to just repeat the CT scan in a year  Keep your appt with me and we can discuss it more then

## 2019-03-26 ENCOUNTER — TELEPHONE (OUTPATIENT)
Dept: INTERNAL MEDICINE CLINIC | Facility: CLINIC | Age: 54
End: 2019-03-26

## 2019-03-26 DIAGNOSIS — Z12.31 ENCOUNTER FOR SCREENING MAMMOGRAM FOR BREAST CANCER: Primary | ICD-10-CM

## 2019-07-30 ENCOUNTER — CONSULT (OUTPATIENT)
Dept: GASTROENTEROLOGY | Facility: AMBULARY SURGERY CENTER | Age: 54
End: 2019-07-30
Payer: COMMERCIAL

## 2019-07-30 VITALS
TEMPERATURE: 97.7 F | SYSTOLIC BLOOD PRESSURE: 100 MMHG | BODY MASS INDEX: 23.83 KG/M2 | DIASTOLIC BLOOD PRESSURE: 70 MMHG | WEIGHT: 139.6 LBS | HEART RATE: 82 BPM | HEIGHT: 64 IN | RESPIRATION RATE: 16 BRPM

## 2019-07-30 DIAGNOSIS — Z12.11 SCREENING FOR COLON CANCER: Primary | ICD-10-CM

## 2019-07-30 DIAGNOSIS — R10.12 LEFT UPPER QUADRANT PAIN: ICD-10-CM

## 2019-07-30 DIAGNOSIS — K14.6 BURNING MOUTH SYNDROME: ICD-10-CM

## 2019-07-30 DIAGNOSIS — K59.09 OTHER CONSTIPATION: ICD-10-CM

## 2019-07-30 PROCEDURE — 99244 OFF/OP CNSLTJ NEW/EST MOD 40: CPT | Performed by: INTERNAL MEDICINE

## 2019-07-30 RX ORDER — OMEGA-3S/DHA/EPA/FISH OIL/D3 300MG-1000
400 CAPSULE ORAL DAILY
COMMUNITY

## 2019-07-30 RX ORDER — PANTOPRAZOLE SODIUM 40 MG/1
40 TABLET, DELAYED RELEASE ORAL DAILY
Qty: 30 TABLET | Refills: 3 | Status: SHIPPED | OUTPATIENT
Start: 2019-07-30 | End: 2019-11-22 | Stop reason: SDUPTHER

## 2019-07-30 NOTE — PROGRESS NOTES
Consultation - [facility]  Nani Dominguez 62 y o  female MRN: [id number]          Assessment & Plan:    Very pleasant 59-year-old female here for screening colonoscopy, incidentally noted to have some constipation left upper quadrant discomfort and burning mouth syndrome  1   Screening colonoscopy: The patient is of average risk, she has never had a screening examination  -we will schedule her colonoscopy  -discussed with her the risks of the procedure including bleeding, surgery, perforation, missed polyp detection rate    2  Left upper quadrant pain:  Secondary to chronic constipation which is functional longstanding history of for the patient  -recommended daily fiber supplement increased fluid intake  -discussed with the patient that this pain has been going on for 10 years and is likely functional but also a benign process  -can consider abdominal imaging in the future if not improved    3  Burning mouth syndrome:  Discussed with the patient that this is a difficult diagnosis both to make an to treat  -we will start with daily PPI therapy, can consider medications such as amitriptyline the future if this is not help    _____________________________________________________________        CC:  Evaluation for colonoscopy    HPI:  Nani Dominguez is a 62 y  o female who was referred for evaluation of colonoscopy  As you know this is an otherwise healthy and pleasant 59-year-old female, recently retired moved to [place] from [place]  She reports she has had intermittent left upper quadrant abdominal pain  In the past she has had constipation with a bowel movement once per week, since moving and retiring her bowel movements are about 3 days per week  Her pain seems to improve with eating, seems to be a dull ache which occurs for several days maybe 2 or 3 days in a row about twice per month  Denies any melena or rectal bleeding, bowel movements seem to help the symptoms    Seems to be less frequent now since she has been having more frequent bowel movements  The patient has never had a colonoscopy  Additionally she has been diagnosed with burning mouth syndrome  She denies any nausea, vomiting, dysphagia, typical heartburn symptoms  Patient continues to smoke about half pack a day denies any alcohol  She is retired from [employer]  Denies any significant medical surgical history  Family history is negative for GI or associated malignancies  ROS:  The remainder of the ROS was negative except for the pertinent positives mentioned in HPI  Allergies: Patient has no known allergies  Medications:   Current Outpatient Medications:     ALPHA LIPOIC ACID PO, Take 600 mg by mouth daily, Disp: , Rfl:     ALPRAZolam (XANAX) 0 25 mg tablet, Take 1 tablet (0 25 mg total) by mouth daily as needed for anxiety, Disp: 30 tablet, Rfl: 0    Cholecalciferol (VITAMIN D3) 2000 units capsule, Take 1 capsule (2,000 Units total) by mouth daily, Disp: 90 capsule, Rfl: 1    cholecalciferol (VITAMIN D3) 400 units tablet, Take 400 Units by mouth daily, Disp: , Rfl:     meloxicam (MOBIC) 15 mg tablet, Take 1 tablet (15 mg total) by mouth daily (Patient not taking: Reported on 7/30/2019), Disp: 30 tablet, Rfl: 1    Na Sulfate-K Sulfate-Mg Sulf (SUPREP BOWEL PREP KIT) 17 5-3 13-1 6 GM/177ML SOLN, Take 1 Bottle by mouth once for 1 dose, Disp: 1 Bottle, Rfl: 0    pantoprazole (PROTONIX) 40 mg tablet, Take 1 tablet (40 mg total) by mouth daily, Disp: 30 tablet, Rfl: 3    traZODone (DESYREL) 50 mg tablet, Take 1 tablet (50 mg total) by mouth daily at bedtime (Patient not taking: Reported on 7/30/2019), Disp: 30 tablet, Rfl: 1'    Past Medical History:   Diagnosis Date    Anxiety        History reviewed  No pertinent surgical history      Family History   Problem Relation Age of Onset    Depression Sister 48    Stroke Sister     Breast cancer Sister 39        breast        reports that she has been smoking  She has a 60 00 pack-year smoking history  She has never used smokeless tobacco  She reports that she does not use drugs            Physical Exam:     /70 (BP Location: Left arm, Patient Position: Sitting, Cuff Size: Standard)   Pulse 82   Temp 97 7 °F (36 5 °C) (Tympanic)   Resp 16   Ht 5' 4" (1 626 m)   Wt 63 3 kg (139 lb 9 6 oz)   BMI 23 96 kg/m²     Gen: wn/wd, NAD, healthy-appearing female  HEENT: anicteric, MMM, no cervical LAD  CVS: RRR, no m/r/g  CHEST: CTA b/l  ABD: +BS, soft, NT,ND, no hepatosplenomegaly  EXT: no c/c/e  NEURO: aaox3  SKIN: NO rashes

## 2019-07-30 NOTE — LETTER
July 30, 2019     Jeanne Campa MD  [address]  [address] [place] [address]    Patient: Mario Curiel   YOB: 1965   Date of Visit: 7/30/2019       Dear Dr Mary Tineo: Thank you for referring Mario Curiel to me for evaluation  Below are my notes for this consultation  If you have questions, please do not hesitate to call me  I look forward to following your patient along with you  Sincerely,        Susan Garg MD        CC: No Recipients  Susan Garg MD  7/30/2019  2:50 PM  Sign at close encounter  Consultation - [facility]  Mario Curiel 62 y o  female MRN: [id number]          Assessment & Plan:    Very pleasant 68-year-old female here for screening colonoscopy, incidentally noted to have some constipation left upper quadrant discomfort and burning mouth syndrome  1   Screening colonoscopy: The patient is of average risk, she has never had a screening examination  -we will schedule her colonoscopy  -discussed with her the risks of the procedure including bleeding, surgery, perforation, missed polyp detection rate    2  Left upper quadrant pain:  Secondary to chronic constipation which is functional longstanding history of for the patient  -recommended daily fiber supplement increased fluid intake  -discussed with the patient that this pain has been going on for 10 years and is likely functional but also a benign process  -can consider abdominal imaging in the future if not improved    3  Burning mouth syndrome:  Discussed with the patient that this is a difficult diagnosis both to make an to treat  -we will start with daily PPI therapy, can consider medications such as amitriptyline the future if this is not help    _____________________________________________________________        CC:  Evaluation for colonoscopy    HPI:  Mario Curiel is a 62 y  o female who was referred for evaluation of colonoscopy    As you know this is an otherwise healthy and pleasant 57-year-old female, recently retired moved to [place] from the [place]  She reports she has had intermittent left upper quadrant abdominal pain  In the past she has had constipation with a bowel movement once per week, since moving and retiring her bowel movements are about 3 days per week  Her pain seems to improve with eating, seems to be a dull ache which occurs for several days maybe 2 or 3 days in a row about twice per month  Denies any melena or rectal bleeding, bowel movements seem to help the symptoms  Seems to be less frequent now since she has been having more frequent bowel movements  The patient has never had a colonoscopy  Additionally she has been diagnosed with burning mouth syndrome  She denies any nausea, vomiting, dysphagia, typical heartburn symptoms  Patient continues to smoke about half pack a day denies any alcohol  She is retired from [employer]  Denies any significant medical surgical history  Family history is negative for GI or associated malignancies  ROS:  The remainder of the ROS was negative except for the pertinent positives mentioned in HPI  Allergies: Patient has no known allergies      Medications:   Current Outpatient Medications:     ALPHA LIPOIC ACID PO, Take 600 mg by mouth daily, Disp: , Rfl:     ALPRAZolam (XANAX) 0 25 mg tablet, Take 1 tablet (0 25 mg total) by mouth daily as needed for anxiety, Disp: 30 tablet, Rfl: 0    Cholecalciferol (VITAMIN D3) 2000 units capsule, Take 1 capsule (2,000 Units total) by mouth daily, Disp: 90 capsule, Rfl: 1    cholecalciferol (VITAMIN D3) 400 units tablet, Take 400 Units by mouth daily, Disp: , Rfl:     meloxicam (MOBIC) 15 mg tablet, Take 1 tablet (15 mg total) by mouth daily (Patient not taking: Reported on 7/30/2019), Disp: 30 tablet, Rfl: 1    Na Sulfate-K Sulfate-Mg Sulf (SUPREP BOWEL PREP KIT) 17 5-3 13-1 6 GM/177ML SOLN, Take 1 Bottle by mouth once for 1 dose, Disp: 1 Bottle, Rfl: 0    pantoprazole (PROTONIX) 40 mg tablet, Take 1 tablet (40 mg total) by mouth daily, Disp: 30 tablet, Rfl: 3    traZODone (DESYREL) 50 mg tablet, Take 1 tablet (50 mg total) by mouth daily at bedtime (Patient not taking: Reported on 7/30/2019), Disp: 30 tablet, Rfl: 1'    Past Medical History:   Diagnosis Date    Anxiety        History reviewed  No pertinent surgical history  Family History   Problem Relation Age of Onset    Depression Sister 48    Stroke Sister     Breast cancer Sister 39        breast        reports that she has been smoking  She has a 60 00 pack-year smoking history  She has never used smokeless tobacco  She reports that she does not use drugs            Physical Exam:     /70 (BP Location: Left arm, Patient Position: Sitting, Cuff Size: Standard)   Pulse 82   Temp 97 7 °F (36 5 °C) (Tympanic)   Resp 16   Ht 5' 4" (1 626 m)   Wt 63 3 kg (139 lb 9 6 oz)   BMI 23 96 kg/m²      Gen: wn/wd, NAD, healthy-appearing female  HEENT: anicteric, MMM, no cervical LAD  CVS: RRR, no m/r/g  CHEST: CTA b/l  ABD: +BS, soft, NT,ND, no hepatosplenomegaly  EXT: no c/c/e  NEURO: aaox3  SKIN: NO rashes

## 2019-09-18 ENCOUNTER — TELEPHONE (OUTPATIENT)
Dept: GASTROENTEROLOGY | Facility: MEDICAL CENTER | Age: 54
End: 2019-09-18

## 2019-09-18 NOTE — TELEPHONE ENCOUNTER
Dr Ernesto Buckner     Patient called to reschedule her procedure   Patient can be reached at [phone]

## 2019-11-22 DIAGNOSIS — K14.6 BURNING MOUTH SYNDROME: ICD-10-CM

## 2019-11-22 RX ORDER — PANTOPRAZOLE SODIUM 40 MG/1
TABLET, DELAYED RELEASE ORAL
Qty: 30 TABLET | Refills: 3 | Status: SHIPPED | OUTPATIENT
Start: 2019-11-22

## 2020-01-27 ENCOUNTER — APPOINTMENT (OUTPATIENT)
Dept: LAB | Facility: MEDICAL CENTER | Age: 55
End: 2020-01-27
Payer: COMMERCIAL

## 2020-01-27 ENCOUNTER — TRANSCRIBE ORDERS (OUTPATIENT)
Dept: ADMINISTRATIVE | Facility: HOSPITAL | Age: 55
End: 2020-01-27

## 2020-01-27 DIAGNOSIS — E11.9 DIABETES MELLITUS WITHOUT COMPLICATION (HCC): ICD-10-CM

## 2020-01-27 DIAGNOSIS — R31.9 HEMATURIA SYNDROME: ICD-10-CM

## 2020-01-27 DIAGNOSIS — R68.89 MECHANICAL AND MOTOR PROBLEMS WITH INTERNAL ORGANS: Primary | ICD-10-CM

## 2020-01-27 DIAGNOSIS — E87.1 HYPOSMOLALITY SYNDROME: ICD-10-CM

## 2020-01-27 DIAGNOSIS — K83.3 FISTULA OF BILE DUCT: ICD-10-CM

## 2020-01-27 DIAGNOSIS — E55.9 VITAMIN D DEFICIENCY: ICD-10-CM

## 2020-01-27 DIAGNOSIS — E03.9 HYPOTHYROIDISM, UNSPECIFIED TYPE: ICD-10-CM

## 2020-01-27 DIAGNOSIS — R68.89 MECHANICAL AND MOTOR PROBLEMS WITH INTERNAL ORGANS: ICD-10-CM

## 2020-01-27 LAB
25(OH)D3 SERPL-MCNC: 17 NG/ML (ref 30–100)
ALT SERPL W P-5'-P-CCNC: 46 U/L (ref 12–78)
ANION GAP SERPL CALCULATED.3IONS-SCNC: 3 MMOL/L (ref 4–13)
AST SERPL W P-5'-P-CCNC: 23 U/L (ref 5–45)
BACTERIA UR QL AUTO: NORMAL /HPF
BILIRUB UR QL STRIP: NEGATIVE
BUN SERPL-MCNC: 13 MG/DL (ref 5–25)
CALCIUM SERPL-MCNC: 9.4 MG/DL (ref 8.3–10.1)
CHLORIDE SERPL-SCNC: 108 MMOL/L (ref 100–108)
CHOLEST SERPL-MCNC: 266 MG/DL (ref 50–200)
CLARITY UR: CLEAR
CO2 SERPL-SCNC: 29 MMOL/L (ref 21–32)
COLOR UR: YELLOW
CREAT SERPL-MCNC: 0.77 MG/DL (ref 0.6–1.3)
ERYTHROCYTE [DISTWIDTH] IN BLOOD BY AUTOMATED COUNT: 12.1 % (ref 11.6–15.1)
EST. AVERAGE GLUCOSE BLD GHB EST-MCNC: 97 MG/DL
GFR SERPL CREATININE-BSD FRML MDRD: 86 ML/MIN/1.73SQ M
GLUCOSE P FAST SERPL-MCNC: 83 MG/DL (ref 65–99)
GLUCOSE UR STRIP-MCNC: NEGATIVE MG/DL
HBA1C MFR BLD: 5 % (ref 4.2–6.3)
HCT VFR BLD AUTO: 42.5 % (ref 34.8–46.1)
HDLC SERPL-MCNC: 41 MG/DL
HGB BLD-MCNC: 14.2 G/DL (ref 11.5–15.4)
HGB UR QL STRIP.AUTO: NEGATIVE
HYALINE CASTS #/AREA URNS LPF: NORMAL /LPF
KETONES UR STRIP-MCNC: NEGATIVE MG/DL
LDLC SERPL CALC-MCNC: 165 MG/DL (ref 0–100)
LDLC SERPL DIRECT ASSAY-MCNC: 153 MG/DL (ref 0–100)
LEUKOCYTE ESTERASE UR QL STRIP: NEGATIVE
MCH RBC QN AUTO: 33.2 PG (ref 26.8–34.3)
MCHC RBC AUTO-ENTMCNC: 33.4 G/DL (ref 31.4–37.4)
MCV RBC AUTO: 99 FL (ref 82–98)
NITRITE UR QL STRIP: NEGATIVE
NON-SQ EPI CELLS URNS QL MICRO: NORMAL /HPF
NONHDLC SERPL-MCNC: 225 MG/DL
PH UR STRIP.AUTO: 5.5 [PH]
PLATELET # BLD AUTO: 302 THOUSANDS/UL (ref 149–390)
PMV BLD AUTO: 9.8 FL (ref 8.9–12.7)
POTASSIUM SERPL-SCNC: 4.2 MMOL/L (ref 3.5–5.3)
PROT UR STRIP-MCNC: NEGATIVE MG/DL
RBC # BLD AUTO: 4.28 MILLION/UL (ref 3.81–5.12)
RBC #/AREA URNS AUTO: NORMAL /HPF
SODIUM SERPL-SCNC: 140 MMOL/L (ref 136–145)
SP GR UR STRIP.AUTO: 1.02 (ref 1–1.03)
T3 SERPL-MCNC: 1.1 NG/ML (ref 0.6–1.8)
T4 SERPL-MCNC: 9.3 UG/DL (ref 4.7–13.3)
TRIGL SERPL-MCNC: 301 MG/DL
TSH SERPL DL<=0.05 MIU/L-ACNC: 1.6 UIU/ML (ref 0.36–3.74)
UROBILINOGEN UR QL STRIP.AUTO: 0.2 E.U./DL
WBC # BLD AUTO: 7.67 THOUSAND/UL (ref 4.31–10.16)
WBC #/AREA URNS AUTO: NORMAL /HPF

## 2020-01-27 PROCEDURE — 83721 ASSAY OF BLOOD LIPOPROTEIN: CPT | Performed by: INTERNAL MEDICINE

## 2020-01-27 PROCEDURE — 84436 ASSAY OF TOTAL THYROXINE: CPT

## 2020-01-27 PROCEDURE — 83036 HEMOGLOBIN GLYCOSYLATED A1C: CPT | Performed by: INTERNAL MEDICINE

## 2020-01-27 PROCEDURE — 80048 BASIC METABOLIC PNL TOTAL CA: CPT | Performed by: INTERNAL MEDICINE

## 2020-01-27 PROCEDURE — 84460 ALANINE AMINO (ALT) (SGPT): CPT | Performed by: INTERNAL MEDICINE

## 2020-01-27 PROCEDURE — 84480 ASSAY TRIIODOTHYRONINE (T3): CPT

## 2020-01-27 PROCEDURE — 81001 URINALYSIS AUTO W/SCOPE: CPT | Performed by: INTERNAL MEDICINE

## 2020-01-27 PROCEDURE — 36415 COLL VENOUS BLD VENIPUNCTURE: CPT | Performed by: INTERNAL MEDICINE

## 2020-01-27 PROCEDURE — 84443 ASSAY THYROID STIM HORMONE: CPT | Performed by: INTERNAL MEDICINE

## 2020-01-27 PROCEDURE — 80061 LIPID PANEL: CPT | Performed by: INTERNAL MEDICINE

## 2020-01-27 PROCEDURE — 82306 VITAMIN D 25 HYDROXY: CPT | Performed by: INTERNAL MEDICINE

## 2020-01-27 PROCEDURE — 84450 TRANSFERASE (AST) (SGOT): CPT | Performed by: INTERNAL MEDICINE

## 2020-01-27 PROCEDURE — 85027 COMPLETE CBC AUTOMATED: CPT | Performed by: INTERNAL MEDICINE

## 2020-07-24 ENCOUNTER — TELEPHONE (OUTPATIENT)
Dept: INTERNAL MEDICINE CLINIC | Facility: CLINIC | Age: 55
End: 2020-07-24

## 2020-07-24 NOTE — TELEPHONE ENCOUNTER
Please ask if still our patient  If so, schedule annual physical   She is also due for repeat chest CT scan

## 2021-03-10 DIAGNOSIS — Z23 ENCOUNTER FOR IMMUNIZATION: ICD-10-CM

## 2021-03-17 ENCOUNTER — IMMUNIZATIONS (OUTPATIENT)
Dept: FAMILY MEDICINE CLINIC | Facility: HOSPITAL | Age: 56
End: 2021-03-17

## 2021-03-17 DIAGNOSIS — Z23 ENCOUNTER FOR IMMUNIZATION: Primary | ICD-10-CM

## 2021-03-17 PROCEDURE — 0001A SARS-COV-2 / COVID-19 MRNA VACCINE (PFIZER-BIONTECH) 30 MCG: CPT

## 2021-03-17 PROCEDURE — 91300 SARS-COV-2 / COVID-19 MRNA VACCINE (PFIZER-BIONTECH) 30 MCG: CPT

## 2021-04-10 ENCOUNTER — IMMUNIZATIONS (OUTPATIENT)
Dept: FAMILY MEDICINE CLINIC | Facility: HOSPITAL | Age: 56
End: 2021-04-10

## 2021-04-10 DIAGNOSIS — Z23 ENCOUNTER FOR IMMUNIZATION: Primary | ICD-10-CM

## 2021-04-10 PROCEDURE — 0002A SARS-COV-2 / COVID-19 MRNA VACCINE (PFIZER-BIONTECH) 30 MCG: CPT

## 2021-04-10 PROCEDURE — 91300 SARS-COV-2 / COVID-19 MRNA VACCINE (PFIZER-BIONTECH) 30 MCG: CPT

## 2021-05-20 ENCOUNTER — OFFICE VISIT (OUTPATIENT)
Dept: CARDIOLOGY CLINIC | Facility: MEDICAL CENTER | Age: 56
End: 2021-05-20
Payer: COMMERCIAL

## 2021-05-20 VITALS
BODY MASS INDEX: 23.85 KG/M2 | HEART RATE: 78 BPM | HEIGHT: 63 IN | OXYGEN SATURATION: 97 % | WEIGHT: 134.6 LBS | DIASTOLIC BLOOD PRESSURE: 72 MMHG | SYSTOLIC BLOOD PRESSURE: 112 MMHG

## 2021-05-20 DIAGNOSIS — E78.5 DYSLIPIDEMIA: ICD-10-CM

## 2021-05-20 DIAGNOSIS — I73.9 CLAUDICATION (HCC): ICD-10-CM

## 2021-05-20 DIAGNOSIS — R06.00 DYSPNEA ON EXERTION: Primary | ICD-10-CM

## 2021-05-20 DIAGNOSIS — F17.200 SMOKER: ICD-10-CM

## 2021-05-20 PROBLEM — R06.09 DYSPNEA ON EXERTION: Status: ACTIVE | Noted: 2021-05-20

## 2021-05-20 PROBLEM — E78.2 MIXED HYPERLIPIDEMIA: Status: RESOLVED | Noted: 2019-03-06 | Resolved: 2021-05-20

## 2021-05-20 PROBLEM — E78.2 MIXED HYPERLIPIDEMIA: Status: ACTIVE | Noted: 2019-03-06

## 2021-05-20 PROCEDURE — 99204 OFFICE O/P NEW MOD 45 MIN: CPT | Performed by: INTERNAL MEDICINE

## 2021-05-20 PROCEDURE — 93000 ELECTROCARDIOGRAM COMPLETE: CPT | Performed by: INTERNAL MEDICINE

## 2021-05-20 PROCEDURE — 3008F BODY MASS INDEX DOCD: CPT | Performed by: INTERNAL MEDICINE

## 2021-05-20 NOTE — ASSESSMENT & PLAN NOTE
Exertional dyspnea possibly multifactorial   It may be related to underlying emphysema as documented on the recent CT of the chest   Nevertheless other possibilities include underlying coronary disease  CT calcium score planned  Smoking cessation discussed

## 2021-05-20 NOTE — ASSESSMENT & PLAN NOTE
She has smoked for over 40 years  She has cut down from a pack a day down to about a 1/3 of a pack a day  She continues to make efforts to quit smoking  Encouraged to continue to do so to lower her future cardiovascular and cancer risk

## 2021-05-20 NOTE — PATIENT INSTRUCTIONS
Rest-stress THAO to assess circulation  We discussed the risks of tobacco use including but not limited to heart attack, stroke, cancer and chronic lung disease  Lipids and CMP  Tobacco cessation strategies discussed  Patient expressed understanding  Cardiac CT Calcium Score:  A CT calcium score exam, also known as a coronary calcium scan, is a quick, convenient and noninvasive way of evaluating the amount of calcified (hard) plaque in your heart vessels  The level of calcium equates to the extent of plaque build-up in your arteries  Plaque in the arteries can cause heart attacks  The radiologist reads the images and sends your cardiologist a report with a calcium score  Patients with higher scores have a greater risk for a heart attack, heart disease or stroke  Knowing your score can help us decide on blood pressure and cholesterol goals that will minimize your risk as much as possible  The [employer] [facility] Card[facility] found that Coronary artery calcification (CAC) is an excellent cardiovascular disease risk marker and can help guide the decision to use cholesterol reducing medications such as statins  A negative calcium score may reduce the need for statins in otherwise eligible patients  Knowing your score could save your life  The exam takes less than 10 minutes, is painless and does not require any IV or oral contrast  The exam is typically not covered by insurance   The fee at [place] [address] radiology locations is $99

## 2021-05-20 NOTE — ASSESSMENT & PLAN NOTE
Reports moderate exertional claudication with bilateral calf pain on exertion  Not significantly limited from this  Counseled on tobacco cessation  Plan exercise THAO to assess  Obstructive peripheral arterial disease

## 2021-05-20 NOTE — ASSESSMENT & PLAN NOTE
Lipids over a year ago showed LDL of over 160 and triglycerides are over 300  HDL was 41  She meets criteria for statin therapy  We will recheck fasting lipids and comprehensive metabolic panel  Initiate statins if LDL still elevated  Calcium score planned for  further risk stratification

## 2021-05-20 NOTE — PROGRESS NOTES
[facility] [address]  Phone#  [phone]  Fax#  [phone]  [facility] Consultation             NAME: Sia Salazar  AGE: 60 y o  SEX: female   : 1965   MRN: [id number]    DATE: 2021  TIME: 9:47 AM    Assessment and plan:    1  Dyspnea on exertion  Assessment & Plan:    Exertional dyspnea possibly multifactorial   It may be related to underlying emphysema as documented on the recent CT of the chest   Nevertheless other possibilities include underlying coronary disease  CT calcium score planned  Smoking cessation discussed  Orders:  -     CT coronary calcium score; Future; Expected date: 2021  -     Comprehensive metabolic panel    2  Smoker  Assessment & Plan:    She has smoked for over 40 years  She has cut down from a pack a day down to about a 1/3 of a pack a day  She continues to make efforts to quit smoking  Encouraged to continue to do so to lower her future cardiovascular and cancer risk  Orders:  -     POCT ECG    3  Claudication [facility])  Assessment & Plan:    Reports moderate exertional claudication with bilateral calf pain on exertion  Not significantly limited from this  Counseled on tobacco cessation  Plan exercise THAO to assess  Obstructive peripheral arterial disease  Orders:  -     VAS THAO with exercise study; Future; Expected date: 2021    4  Dyslipidemia  Assessment & Plan:    Lipids over a year ago showed LDL of over 160 and triglycerides are over 300  HDL was 41  She meets criteria for statin therapy  We will recheck fasting lipids and comprehensive metabolic panel  Initiate statins if LDL still elevated  Calcium score planned for  further risk stratification  Orders:  -     CT coronary calcium score;  Future; Expected date: 2021  -     Comprehensive metabolic panel  -     Lipid panel         Discussion:    Chief Complaint   Patient presents with   [facility] New Patient Visit     pt would like a lipid panel   Fatigue     ongoing  HPI:  Chilo Yanez is a 60y o -year-old female who presents to the cardiology clinic for evaluation  Her previous cardiologist was in [facility]  She reports shortness of breath on exertion which she attributes to her underlying lung disease  She is a smoker of over 40 pack years and has smoked since  teenage years  She reports functional limitation from her dyspnea  She also reports bilateral calf pain on ambulation  She has no prior history of peripheral vascular disease  No history of CAD, TIA or stroke  She does have significant dyslipidemia and last  with triglycerides of over 300 and HDL of 41  She is not on statins  Her weight remains stable  She has no family history of premature CAD  Her blood pressure is excellent  She is making efforts to cut smoking further  She is down from a pack a day to about 1/3 pack a day        BP Readings from Last 4 Encounters:  05/20/21 : 112/72  07/30/19 : 100/70  03/06/19 : 102/66  02/22/19 : 110/74     Wt Readings from Last 3 Encounters:  05/20/21 : 61 1 kg (134 lb 9 6 oz)  07/30/19 : 63 3 kg (139 lb 9 6 oz)  03/06/19 : 63 5 kg (140 lb)      Lab Results       Component                Value               Date                       LDLCALC                  165 (H)             01/27/2020            Lab Results       Component                Value               Date                       CREATININE               0 77                01/27/2020                       Cardiology Problem list:  Tobacco use   Severe dyslipidemia: Need statins   GERD   Claudication    ALLERGIES:  No Known Allergies      Current Outpatient Medications:     ALPRAZolam (XANAX) 0 25 mg tablet, Take 1 tablet (0 25 mg total) by mouth daily as needed for anxiety (Patient taking differently: Take 0 5 mg by mouth daily at bedtime as needed for anxiety ), Disp: 30 tablet, Rfl: 0    ALPHA LIPOIC ACID PO, Take 600 mg by mouth daily, Disp: , Rfl:     Cholecalciferol (VITAMIN D3) 2000 units capsule, Take 1 capsule (2,000 Units total) by mouth daily (Patient not taking: Reported on 5/20/2021), Disp: 90 capsule, Rfl: 1    cholecalciferol (VITAMIN D3) 400 units tablet, Take 400 Units by mouth daily, Disp: , Rfl:     meloxicam (MOBIC) 15 mg tablet, Take 1 tablet (15 mg total) by mouth daily (Patient not taking: Reported on 7/30/2019), Disp: 30 tablet, Rfl: 1    Na Sulfate-K Sulfate-Mg Sulf (SUPREP BOWEL PREP KIT) 17 5-3 13-1 6 GM/177ML SOLN, Take 1 Bottle by mouth once for 1 dose (Patient not taking: Reported on 5/20/2021), Disp: 1 Bottle, Rfl: 0    pantoprazole (PROTONIX) 40 mg tablet, TAKE 1 TABLET BY MOUTH EVERY DAY, Disp: 30 tablet, Rfl: 3    traZODone (DESYREL) 50 mg tablet, Take 1 tablet (50 mg total) by mouth daily at bedtime (Patient not taking: Reported on 7/30/2019), Disp: 30 tablet, Rfl: 1      Review of Systems   Constitutional: Positive for fatigue  Negative for activity change, appetite change and unexpected weight change  HENT: Negative for trouble swallowing  Eyes: Negative for visual disturbance  Respiratory: Positive for shortness of breath  Negative for chest tightness and wheezing  Cardiovascular: Negative for chest pain, palpitations and leg swelling  Gastrointestinal: Negative for blood in stool  Endocrine: Negative for polyuria  Genitourinary: Negative for hematuria  Musculoskeletal: Positive for myalgias  Negative for arthralgias  Skin: Negative for rash  Neurological: Negative for dizziness and weakness  Psychiatric/Behavioral: Negative for behavioral problems  Past Medical History:   Diagnosis Date    Anxiety        History reviewed  No pertinent surgical history      Family History   Problem Relation Age of Onset    Depression Sister 48    Stroke Sister     Breast cancer Sister 39        breast    Stroke Mother        Social History     Socioeconomic History    Marital status: /Civil Union     Spouse name: Not on file    Number of children: Not on file    Years of education: Not on file    Highest education level: Not on file   Occupational History    Not on file   Social Needs    Financial resource strain: Not on file    Food insecurity     Worry: Not on file     Inability: Not on file    Transportation needs     Medical: Not on file     Non-medical: Not on file   Tobacco Use    Smoking status: Current Every Day Smoker     Packs/day: 1 50     Years: 40 00     Pack years: 60 00     Types: Cigarettes    Smokeless tobacco: Never Used   Substance and Sexual Activity    Alcohol use: Not Currently    Drug use: No    Sexual activity: Not on file   Lifestyle    Physical activity     Days per week: Not on file     Minutes per session: Not on file    Stress: Not on file   Relationships    Social connections     Talks on phone: Not on file     Gets together: Not on file     Attends [employer] service: Not on file     Active member of club or organization: Not on file     Attends meetings of clubs or organizations: Not on file     Relationship status: Not on file    Intimate partner violence     Fear of current or ex partner: Not on file     Emotionally abused: Not on file     Physically abused: Not on file     Forced sexual activity: Not on file   Other Topics Concern    Not on file   Social History Narrative    Drinks coffee/tea 2 cups daily        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[place], [place]         Objective:     Vitals:    05/20/21 0847   BP: 112/72   Pulse: 78   SpO2: 97%     Wt Readings from Last 3 Encounters:   05/20/21 61 1 kg (134 lb 9 6 oz)   07/30/19 63 3 kg (139 lb 9 6 oz)   03/06/19 63 5 kg (140 lb)     Pulse Readings from Last 3 Encounters:   05/20/21 78   07/30/19 82   03/06/19 66     BP Readings from Last 3 Encounters:   05/20/21 112/72   07/30/19 100/70   03/06/19 102/66         Physical Exam  Constitutional: General: She is not in acute distress  Appearance: Normal appearance  HENT:      Head: Normocephalic  Mouth/Throat:      Mouth: Mucous membranes are moist    Eyes:      Conjunctiva/sclera: Conjunctivae normal    Neck:      Vascular: No carotid bruit  Cardiovascular:      Rate and Rhythm: Normal rate and regular rhythm  Pulses:           Radial pulses are 2+ on the right side and 2+ on the left side  Dorsalis pedis pulses are 1+ on the right side and 1+ on the left side  Posterior tibial pulses are 1+ on the right side and 1+ on the left side  Heart sounds: Normal heart sounds  No murmur  Pulmonary:      Effort: Pulmonary effort is normal       Breath sounds: Normal breath sounds  Decreased air movement present  No wheezing or rales  Abdominal:      General: Bowel sounds are normal  There is no distension  Musculoskeletal: Normal range of motion  Right lower leg: No edema  Left lower leg: No edema  Skin:     General: Skin is warm  Neurological:      General: No focal deficit present     Psychiatric:         Mood and Affect: Mood normal          Pertinent Laboratory/Diagnostic Studies:    Laboratory studies reviewed personally by Nuzhat Roberts MD    BMP:   Lab Results   Component Value Date    SODIUM 140 01/27/2020    K 4 2 01/27/2020     01/27/2020    CO2 29 01/27/2020    BUN 13 01/27/2020    CREATININE 0 77 01/27/2020    CALCIUM 9 4 01/27/2020     CBC:  Lab Results   Component Value Date    WBC 7 67 01/27/2020    RBC 4 28 01/27/2020    HGB 14 2 01/27/2020    HCT 42 5 01/27/2020    MCV 99 (H) 01/27/2020    MCH 33 2 01/27/2020    RDW 12 1 01/27/2020     01/27/2020     Coags:    Lipid Profile:   No results found for: CHOL  Lab Results   Component Value Date    HDL 41 01/27/2020     Lab Results   Component Value Date    LDLCALC 165 (H) 01/27/2020     Lab Results   Component Value Date    TRIG 301 (H) 01/27/2020      Lab Results   Component Value Date [id number] 1 600 01/27/2020     Lab Results   Component Value Date    ALT 46 01/27/2020    AST 23 01/27/2020       Imaging Studies:   No results found  Cardiac testing:   EKG reviewed personally: normal EKG, normal sinus rhythm  Orders Placed This Encounter   Procedures    CT coronary calcium score    Comprehensive metabolic panel    Lipid panel    POCT ECG           Phyllis Kanner, MD, [facility]    Portions of the record may have been created with voice recognition software  Occasional wrong word or "sound a like" substitutions may have occurred due to the inherent limitations of voice recognition software  Read the chart carefully and recognize, using context, where substitutions have occurred  If you have any questions or concerns about the context, text or information contained within the body of this dictation, please contact myself, the provider, for further clarification

## 2021-06-03 ENCOUNTER — HOSPITAL ENCOUNTER (OUTPATIENT)
Dept: NON INVASIVE DIAGNOSTICS | Facility: CLINIC | Age: 56
Discharge: HOME | End: 2021-06-03
Payer: COMMERCIAL

## 2021-06-03 DIAGNOSIS — I73.9 CLAUDICATION (HCC): ICD-10-CM

## 2021-06-03 PROCEDURE — 93924 LWR XTR VASC STDY BILAT: CPT

## 2021-06-03 PROCEDURE — 93924 LWR XTR VASC STDY BILAT: CPT | Performed by: SURGERY

## 2021-06-03 NOTE — RESULT ENCOUNTER NOTE
Rest- stress THAO is normal   This suggests good blood flow to both the legs  Leg pain is not related to blockages in the leg arteries  Overall these results are reassuring

## 2021-06-04 ENCOUNTER — TELEPHONE (OUTPATIENT)
Dept: CARDIOLOGY CLINIC | Facility: MEDICAL CENTER | Age: 56
End: 2021-06-04

## 2021-06-04 ENCOUNTER — APPOINTMENT (OUTPATIENT)
Dept: LAB | Facility: MEDICAL CENTER | Age: 56
End: 2021-06-04
Payer: COMMERCIAL

## 2021-06-04 DIAGNOSIS — E78.5 DYSLIPIDEMIA: Primary | ICD-10-CM

## 2021-06-04 LAB
ALBUMIN SERPL BCP-MCNC: 4 G/DL (ref 3.5–5)
ALP SERPL-CCNC: 79 U/L (ref 46–116)
ALT SERPL W P-5'-P-CCNC: 37 U/L (ref 12–78)
ANION GAP SERPL CALCULATED.3IONS-SCNC: 2 MMOL/L (ref 4–13)
AST SERPL W P-5'-P-CCNC: 21 U/L (ref 5–45)
BILIRUB SERPL-MCNC: 0.42 MG/DL (ref 0.2–1)
BUN SERPL-MCNC: 13 MG/DL (ref 5–25)
CALCIUM SERPL-MCNC: 10 MG/DL (ref 8.3–10.1)
CHLORIDE SERPL-SCNC: 109 MMOL/L (ref 100–108)
CHOLEST SERPL-MCNC: 268 MG/DL (ref 50–200)
CO2 SERPL-SCNC: 29 MMOL/L (ref 21–32)
CREAT SERPL-MCNC: 0.87 MG/DL (ref 0.6–1.3)
GFR SERPL CREATININE-BSD FRML MDRD: 74 ML/MIN/1.73SQ M
GLUCOSE P FAST SERPL-MCNC: 78 MG/DL (ref 65–99)
HDLC SERPL-MCNC: 58 MG/DL
LDLC SERPL CALC-MCNC: 180 MG/DL (ref 0–100)
NONHDLC SERPL-MCNC: 210 MG/DL
POTASSIUM SERPL-SCNC: 5.1 MMOL/L (ref 3.5–5.3)
PROT SERPL-MCNC: 7.7 G/DL (ref 6.4–8.2)
SODIUM SERPL-SCNC: 140 MMOL/L (ref 136–145)
TRIGL SERPL-MCNC: 150 MG/DL

## 2021-06-04 PROCEDURE — 36415 COLL VENOUS BLD VENIPUNCTURE: CPT | Performed by: INTERNAL MEDICINE

## 2021-06-04 PROCEDURE — 80053 COMPREHEN METABOLIC PANEL: CPT | Performed by: INTERNAL MEDICINE

## 2021-06-04 PROCEDURE — 80061 LIPID PANEL: CPT | Performed by: INTERNAL MEDICINE

## 2021-06-04 RX ORDER — ROSUVASTATIN CALCIUM 10 MG/1
10 TABLET, COATED ORAL
Qty: 30 TABLET | Refills: 6 | Status: SHIPPED | OUTPATIENT
Start: 2021-06-04 | End: 2021-08-03 | Stop reason: SDUPTHER

## 2021-06-04 NOTE — TELEPHONE ENCOUNTER
----- Message from Ruthy Baca MD sent at 6/4/2021  1:16 PM EDT -----  Lipid profile reviewed  Cholesterol results are   Abnormal     LDL is markedly elevated at 180  This is the bad cholesterol  This has gone up from previous value of 165  Triglycerides are normal     Liver function studies are acceptable  In light of these findings, I feel that cholesterol-lowering medication is indicated since your 10 year risk of developing heart disease and heart attack is about 7 4%  I would recommend starting Crestor 10 mg:  Initially you may start it every other night for a couple of weeks and if you have no side effects like muscle aches, you can increase to every night

## 2021-06-04 NOTE — TELEPHONE ENCOUNTER
I informed patient of your message she is asking if you think she can lower her levels with out the medication by diet and exercise

## 2021-06-04 NOTE — RESULT ENCOUNTER NOTE
Lipid profile reviewed  Cholesterol results are   Abnormal     LDL is markedly elevated at 180  This is the bad cholesterol  This has gone up from previous value of 165  Triglycerides are normal     Liver function studies are acceptable  In light of these findings, I feel that cholesterol-lowering medication is indicated since your 10 year risk of developing heart disease and heart attack is about 7 4%  I would recommend starting Crestor 10 mg:  Initially you may start it every other night for a couple of weeks and if you have no side effects like muscle aches, you can increase to every night

## 2021-06-04 NOTE — TELEPHONE ENCOUNTER
----- Message from Cabrera Fowler MD sent at 6/3/2021  7:54 PM EDT -----  Rest- stress THAO is normal   This suggests good blood flow to both the legs  Leg pain is not related to blockages in the leg arteries  Overall these results are reassuring

## 2021-06-12 ENCOUNTER — HOSPITAL ENCOUNTER (OUTPATIENT)
Dept: CT IMAGING | Facility: HOSPITAL | Age: 56
Discharge: HOME | End: 2021-06-12
Attending: INTERNAL MEDICINE
Payer: COMMERCIAL

## 2021-06-12 DIAGNOSIS — R06.00 DYSPNEA ON EXERTION: ICD-10-CM

## 2021-06-12 DIAGNOSIS — E78.5 DYSLIPIDEMIA: ICD-10-CM

## 2021-06-12 PROCEDURE — G1004 CDSM NDSC: HCPCS

## 2021-06-12 PROCEDURE — 75571 CT HRT W/O DYE W/CA TEST: CPT

## 2021-06-21 NOTE — RESULT ENCOUNTER NOTE
Calcium score is very low  This implies there is very mild hardening of the heart arteries noted and as a result low risk of significant blockages in heart arteries  Continue modification of cardiac disease risk factors to lower future risk of heart disease  Risk can be lowered with lifestyle and risk factor modification including regular exercise, avoidance of tobacco, weight management and Mediterranean style plant-based diet  Lipid lowering with statins has shown proven benefits in lowering risk of heart attack and stroke-continue rosuvastatin

## 2021-08-03 DIAGNOSIS — E78.5 DYSLIPIDEMIA: ICD-10-CM

## 2021-08-03 RX ORDER — ROSUVASTATIN CALCIUM 10 MG/1
10 TABLET, COATED ORAL
Qty: 90 TABLET | Refills: 3 | Status: SHIPPED | OUTPATIENT
Start: 2021-08-03

## 2021-12-29 ENCOUNTER — IMMUNIZATIONS (OUTPATIENT)
Dept: FAMILY MEDICINE CLINIC | Facility: HOSPITAL | Age: 56
End: 2021-12-29

## 2021-12-29 DIAGNOSIS — Z23 ENCOUNTER FOR IMMUNIZATION: Primary | ICD-10-CM

## 2021-12-29 PROCEDURE — 91300 COVID-19 PFIZER VACC 0.3 ML: CPT

## 2021-12-29 PROCEDURE — 0001A COVID-19 PFIZER VACC 0.3 ML: CPT

## 2022-08-24 ENCOUNTER — OFFICE VISIT (OUTPATIENT)
Dept: FAMILY MEDICINE CLINIC | Facility: CLINIC | Age: 57
End: 2022-08-24
Payer: COMMERCIAL

## 2022-08-24 VITALS
SYSTOLIC BLOOD PRESSURE: 102 MMHG | BODY MASS INDEX: 24.75 KG/M2 | OXYGEN SATURATION: 97 % | WEIGHT: 145 LBS | HEART RATE: 88 BPM | HEIGHT: 64 IN | TEMPERATURE: 97.3 F | DIASTOLIC BLOOD PRESSURE: 70 MMHG

## 2022-08-24 DIAGNOSIS — Z11.59 NEED FOR HEPATITIS C SCREENING TEST: ICD-10-CM

## 2022-08-24 DIAGNOSIS — Z00.00 PHYSICAL EXAM, ANNUAL: Primary | ICD-10-CM

## 2022-08-24 DIAGNOSIS — Z12.11 SCREENING FOR MALIGNANT NEOPLASM OF COLON: ICD-10-CM

## 2022-08-24 DIAGNOSIS — Z87.891 FORMER SMOKER: ICD-10-CM

## 2022-08-24 DIAGNOSIS — Z12.31 OTHER SCREENING MAMMOGRAM: ICD-10-CM

## 2022-08-24 DIAGNOSIS — E55.9 VITAMIN D DEFICIENCY: ICD-10-CM

## 2022-08-24 DIAGNOSIS — E78.2 MIXED HYPERLIPIDEMIA: ICD-10-CM

## 2022-08-24 DIAGNOSIS — M51.16 INTERVERTEBRAL DISC DISORDER WITH RADICULOPATHY OF LUMBAR REGION: ICD-10-CM

## 2022-08-24 DIAGNOSIS — R30.0 DYSURIA: ICD-10-CM

## 2022-08-24 LAB
SL AMB  POCT GLUCOSE, UA: NEGATIVE
SL AMB LEUKOCYTE ESTERASE,UA: ABNORMAL
SL AMB POCT BILIRUBIN,UA: NEGATIVE
SL AMB POCT BLOOD,UA: ABNORMAL
SL AMB POCT CLARITY,UA: ABNORMAL
SL AMB POCT COLOR,UA: YELLOW
SL AMB POCT KETONES,UA: NEGATIVE
SL AMB POCT NITRITE,UA: POSITIVE
SL AMB POCT PH,UA: 6
SL AMB POCT SPECIFIC GRAVITY,UA: 1.01
SL AMB POCT URINE PROTEIN: ABNORMAL
SL AMB POCT UROBILINOGEN: NEGATIVE

## 2022-08-24 PROCEDURE — 99213 OFFICE O/P EST LOW 20 MIN: CPT | Performed by: FAMILY MEDICINE

## 2022-08-24 PROCEDURE — 81003 URINALYSIS AUTO W/O SCOPE: CPT | Performed by: FAMILY MEDICINE

## 2022-08-24 PROCEDURE — 87077 CULTURE AEROBIC IDENTIFY: CPT | Performed by: FAMILY MEDICINE

## 2022-08-24 PROCEDURE — 3725F SCREEN DEPRESSION PERFORMED: CPT | Performed by: FAMILY MEDICINE

## 2022-08-24 PROCEDURE — 87086 URINE CULTURE/COLONY COUNT: CPT | Performed by: FAMILY MEDICINE

## 2022-08-24 PROCEDURE — 81001 URINALYSIS AUTO W/SCOPE: CPT | Performed by: FAMILY MEDICINE

## 2022-08-24 PROCEDURE — 87186 SC STD MICRODIL/AGAR DIL: CPT | Performed by: FAMILY MEDICINE

## 2022-08-24 PROCEDURE — 99386 PREV VISIT NEW AGE 40-64: CPT | Performed by: FAMILY MEDICINE

## 2022-08-24 RX ORDER — FLUTICASONE PROPIONATE 50 MCG
SPRAY, SUSPENSION (ML) NASAL
COMMUNITY
Start: 2022-07-06 | End: 2022-09-28

## 2022-08-24 RX ORDER — CEPHALEXIN 500 MG/1
500 CAPSULE ORAL EVERY 12 HOURS SCHEDULED
Qty: 10 CAPSULE | Refills: 0 | Status: SHIPPED | OUTPATIENT
Start: 2022-08-24 | End: 2022-08-29

## 2022-08-24 RX ORDER — MELOXICAM 7.5 MG/1
TABLET ORAL
COMMUNITY
Start: 2022-08-23

## 2022-08-24 NOTE — PROGRESS NOTES
Assessment/Plan:    No problem-specific Assessment & Plan notes found for this encounter  Diagnoses and all orders for this visit:    Physical exam, annual  Comments:  doing well in general  check labs  refer to gyn  check mammogram and CT lung CA screening  discussed crc screening--wants to do cologuard  declines tdap today    Orders:  -     Cancel: Ambulatory Referral to Obstetrics / Gynecology; Future  -     CBC and differential; Future  -     Ambulatory Referral to Obstetrics / Gynecology; Future    Need for hepatitis C screening test  -     Hepatitis C antibody; Future    Former smoker  -     CT lung screening program; Future    Other screening mammogram  -     Mammo screening bilateral w 3d & cad; Future    Screening for malignant neoplasm of colon  -     Cologuard    Dysuria  Comments:  dip suspicious for UTI  keflex BID x 5 days  await culture  Orders:  -     POCT urine dip auto non-scope  -     cephalexin (KEFLEX) 500 mg capsule; Take 1 capsule (500 mg total) by mouth every 12 (twelve) hours for 5 days  -     UA (URINE) with reflex to Scope  -     Urine culture; Future  -     Urine culture    Mixed hyperlipidemia  Comments:  labs as ordered  Orders:  -     Comprehensive metabolic panel; Future  -     TSH, 3rd generation; Future  -     Lipid panel; Future    Vitamin D deficiency  -     Vitamin D 25 hydroxy; Future    Intervertebral disc disorder with radiculopathy of lumbar region  Comments:  make f/u appt with pain mgmt    Other orders  -     fluticasone (FLONASE) 50 mcg/act nasal spray; SPRAY 2 SPRAYS INTO EACH NOSTRIL EVERY DAY  -     meloxicam (MOBIC) 7 5 mg tablet        Subjective:      Patient ID: Karen Wong is a 59 y o  female  HPI   Pt presents as new pt for establishment/physical   Preventively, she requires some screening  doing well in general   Retired from [employer] in [place]  Sees dentist   Exercising  Working on weight loss  Has 40 pk yr hx and quit smoking 6 months ago!   she is due for tdap, shingrix, gyn eval, mammogram, crc screening, lung CA screening, hep c screening  From a problem standpoint:   Saw an ENT for burning in throat  flonase didn't help  She is following up with him soon--mentioned possible reflux tx    Has followed with pain mgmt for back pain  Had injection which wasn't overly helpful  Thinks she might have UTI   +dysuria x a week  Took azo which hasn't totally helped  +frequency, urgency  No back pain  No fevers  +hx of HLD  Didn't take crestor  See cards  Low calcium score last year  No chest pain, shortness of breath  The following portions of the patient's history were reviewed and updated as appropriate: allergies, current medications, past family history, past medical history, past social history, past surgical history and problem list     Review of Systems   Constitutional: Negative for chills, fatigue, fever and unexpected weight change  HENT: Negative for congestion, ear pain, hearing loss, postnasal drip, rhinorrhea, sinus pressure, sinus pain, sore throat, trouble swallowing and voice change  Eyes: Negative for pain, redness and visual disturbance  Respiratory: Negative for cough and shortness of breath  Cardiovascular: Negative for chest pain, palpitations and leg swelling  Gastrointestinal: Negative for abdominal pain, constipation, diarrhea and nausea  Endocrine: Negative for cold intolerance, heat intolerance, polydipsia and polyuria  Genitourinary: Positive for dysuria, frequency and urgency  Negative for flank pain  Musculoskeletal: Positive for back pain  Negative for arthralgias, joint swelling and myalgias  Skin: Negative for rash  No suspicious lesions   Neurological: Negative for weakness, numbness and headaches  Hematological: Negative for adenopathy           Objective:      /70 (BP Location: Left arm, Patient Position: Sitting, Cuff Size: Standard)   Pulse 88   Temp (!) 97 3 °F (36 3 °C) (Temporal)   Ht 5' 3 58" (1 615 m)   Wt 65 8 kg (145 lb)   SpO2 97%   BMI 25 22 kg/m²          Physical Exam  Constitutional:       General: She is not in acute distress  Appearance: She is well-developed  HENT:      Head: Normocephalic and atraumatic  Right Ear: Tympanic membrane, ear canal and external ear normal       Left Ear: Tympanic membrane, ear canal and external ear normal       Nose: Nose normal       Mouth/Throat:      Pharynx: No oropharyngeal exudate  Eyes:      Conjunctiva/sclera: Conjunctivae normal       Pupils: Pupils are equal, round, and reactive to light  Neck:      Thyroid: No thyromegaly  Vascular: No carotid bruit or JVD  Cardiovascular:      Rate and Rhythm: Regular rhythm  Heart sounds: S1 normal and S2 normal  No murmur heard  No friction rub  No gallop  No S3 or S4 sounds  Pulmonary:      Effort: Pulmonary effort is normal       Breath sounds: Normal breath sounds  No wheezing, rhonchi or rales  Abdominal:      General: Bowel sounds are normal  There is no distension  Palpations: Abdomen is soft  Tenderness: There is no abdominal tenderness  Lymphadenopathy:      Cervical: No cervical adenopathy  Neurological:      Mental Status: She is alert and oriented to person, place, and time  Cranial Nerves: No cranial nerve deficit  Sensory: No sensory deficit  Deep Tendon Reflexes: Reflexes are normal and symmetric  BMI Counseling: Body mass index is 25 22 kg/m²  The BMI is above normal  Nutrition recommendations include encouraging healthy choices of fruits and vegetables  Exercise recommendations include exercising 3-5 times per week  Rationale for BMI follow-up plan is due to patient being overweight or obese  Depression Screening and Follow-up Plan: Patient was screened for depression during today's encounter  They screened negative with a PHQ-2 score of 0

## 2022-08-24 NOTE — PATIENT INSTRUCTIONS
See if your insurance covers shingrix (shingles shot)    If so, make nurse appt  Refer to gyn  complete cologuard  Schedule CT lung screening  Fasting labs  appts with arleen and pain mgmt  Continue amazing work with non-smoking   Keflex twice daily x 5 days--we'll be in touch with urine culture results

## 2022-08-25 LAB
BACTERIA UR QL AUTO: ABNORMAL /HPF
BILIRUB UR QL STRIP: NEGATIVE
CLARITY UR: ABNORMAL
COLOR UR: YELLOW
GLUCOSE UR STRIP-MCNC: NEGATIVE MG/DL
HGB UR QL STRIP.AUTO: ABNORMAL
KETONES UR STRIP-MCNC: NEGATIVE MG/DL
LEUKOCYTE ESTERASE UR QL STRIP: ABNORMAL
MUCOUS THREADS UR QL AUTO: ABNORMAL
NITRITE UR QL STRIP: POSITIVE
NON-SQ EPI CELLS URNS QL MICRO: ABNORMAL /HPF
PH UR STRIP.AUTO: 6 [PH]
PROT UR STRIP-MCNC: ABNORMAL MG/DL
RBC #/AREA URNS AUTO: ABNORMAL /HPF
SP GR UR STRIP.AUTO: 1.01 (ref 1–1.03)
UROBILINOGEN UR STRIP-ACNC: <2 MG/DL
WBC #/AREA URNS AUTO: ABNORMAL /HPF
WBC CLUMPS # UR AUTO: PRESENT /UL

## 2022-08-27 LAB — BACTERIA UR CULT: ABNORMAL

## 2022-09-09 ENCOUNTER — APPOINTMENT (OUTPATIENT)
Dept: LAB | Facility: MEDICAL CENTER | Age: 57
End: 2022-09-09
Payer: COMMERCIAL

## 2022-09-09 DIAGNOSIS — E78.2 MIXED HYPERLIPIDEMIA: ICD-10-CM

## 2022-09-09 DIAGNOSIS — Z11.59 NEED FOR HEPATITIS C SCREENING TEST: ICD-10-CM

## 2022-09-09 DIAGNOSIS — E55.9 VITAMIN D DEFICIENCY: ICD-10-CM

## 2022-09-09 DIAGNOSIS — Z00.00 PHYSICAL EXAM, ANNUAL: ICD-10-CM

## 2022-09-09 LAB
25(OH)D3 SERPL-MCNC: 28.8 NG/ML (ref 30–100)
ALBUMIN SERPL BCP-MCNC: 3.9 G/DL (ref 3.5–5)
ALP SERPL-CCNC: 86 U/L (ref 46–116)
ALT SERPL W P-5'-P-CCNC: 73 U/L (ref 12–78)
ANION GAP SERPL CALCULATED.3IONS-SCNC: 6 MMOL/L (ref 4–13)
AST SERPL W P-5'-P-CCNC: 30 U/L (ref 5–45)
BASOPHILS # BLD AUTO: 0.03 THOUSANDS/ΜL (ref 0–0.1)
BASOPHILS NFR BLD AUTO: 1 % (ref 0–1)
BILIRUB SERPL-MCNC: 0.63 MG/DL (ref 0.2–1)
BUN SERPL-MCNC: 14 MG/DL (ref 5–25)
CALCIUM SERPL-MCNC: 9.9 MG/DL (ref 8.3–10.1)
CHLORIDE SERPL-SCNC: 105 MMOL/L (ref 96–108)
CHOLEST SERPL-MCNC: 237 MG/DL
CO2 SERPL-SCNC: 28 MMOL/L (ref 21–32)
CREAT SERPL-MCNC: 0.97 MG/DL (ref 0.6–1.3)
EOSINOPHIL # BLD AUTO: 0.1 THOUSAND/ΜL (ref 0–0.61)
EOSINOPHIL NFR BLD AUTO: 2 % (ref 0–6)
ERYTHROCYTE [DISTWIDTH] IN BLOOD BY AUTOMATED COUNT: 12 % (ref 11.6–15.1)
GFR SERPL CREATININE-BSD FRML MDRD: 64 ML/MIN/1.73SQ M
GLUCOSE P FAST SERPL-MCNC: 87 MG/DL (ref 65–99)
HCT VFR BLD AUTO: 41.8 % (ref 34.8–46.1)
HCV AB SER QL: NORMAL
HDLC SERPL-MCNC: 50 MG/DL
HGB BLD-MCNC: 14.1 G/DL (ref 11.5–15.4)
IMM GRANULOCYTES # BLD AUTO: 0.01 THOUSAND/UL (ref 0–0.2)
IMM GRANULOCYTES NFR BLD AUTO: 0 % (ref 0–2)
LDLC SERPL CALC-MCNC: 152 MG/DL (ref 0–100)
LYMPHOCYTES # BLD AUTO: 2.55 THOUSANDS/ΜL (ref 0.6–4.47)
LYMPHOCYTES NFR BLD AUTO: 56 % (ref 14–44)
MCH RBC QN AUTO: 32.9 PG (ref 26.8–34.3)
MCHC RBC AUTO-ENTMCNC: 33.7 G/DL (ref 31.4–37.4)
MCV RBC AUTO: 98 FL (ref 82–98)
MONOCYTES # BLD AUTO: 0.39 THOUSAND/ΜL (ref 0.17–1.22)
MONOCYTES NFR BLD AUTO: 8 % (ref 4–12)
NEUTROPHILS # BLD AUTO: 1.54 THOUSANDS/ΜL (ref 1.85–7.62)
NEUTS SEG NFR BLD AUTO: 33 % (ref 43–75)
NONHDLC SERPL-MCNC: 187 MG/DL
NRBC BLD AUTO-RTO: 0 /100 WBCS
PLATELET # BLD AUTO: 309 THOUSANDS/UL (ref 149–390)
PMV BLD AUTO: 9.6 FL (ref 8.9–12.7)
POTASSIUM SERPL-SCNC: 4.2 MMOL/L (ref 3.5–5.3)
PROT SERPL-MCNC: 7.7 G/DL (ref 6.4–8.4)
RBC # BLD AUTO: 4.28 MILLION/UL (ref 3.81–5.12)
SODIUM SERPL-SCNC: 139 MMOL/L (ref 135–147)
TRIGL SERPL-MCNC: 177 MG/DL
TSH SERPL DL<=0.05 MIU/L-ACNC: 1.18 UIU/ML (ref 0.45–4.5)
WBC # BLD AUTO: 4.62 THOUSAND/UL (ref 4.31–10.16)

## 2022-09-09 PROCEDURE — 82306 VITAMIN D 25 HYDROXY: CPT

## 2022-09-09 PROCEDURE — 84443 ASSAY THYROID STIM HORMONE: CPT

## 2022-09-09 PROCEDURE — 36415 COLL VENOUS BLD VENIPUNCTURE: CPT

## 2022-09-09 PROCEDURE — 80053 COMPREHEN METABOLIC PANEL: CPT

## 2022-09-09 PROCEDURE — 86803 HEPATITIS C AB TEST: CPT

## 2022-09-09 PROCEDURE — 85025 COMPLETE CBC W/AUTO DIFF WBC: CPT

## 2022-09-09 PROCEDURE — 80061 LIPID PANEL: CPT

## 2022-09-09 RX ORDER — ALPRAZOLAM 1 MG/1
1 TABLET ORAL 2 TIMES DAILY
COMMUNITY
Start: 2022-08-30

## 2022-09-09 RX ORDER — OMEPRAZOLE 20 MG/1
20 CAPSULE, DELAYED RELEASE ORAL
COMMUNITY
Start: 2022-08-30

## 2022-09-13 ENCOUNTER — TELEPHONE (OUTPATIENT)
Dept: PAIN MEDICINE | Facility: CLINIC | Age: 57
End: 2022-09-13

## 2022-09-13 NOTE — TELEPHONE ENCOUNTER
PLEASE MAIL NP MIRANDA OUT TO ADDRESS ON FILE     No pain med's,No pain Dr's, Read disclaimer      Last seen Dr Hollie Fernandes in 2019

## 2022-09-14 ENCOUNTER — OFFICE VISIT (OUTPATIENT)
Dept: CARDIOLOGY CLINIC | Facility: CLINIC | Age: 57
End: 2022-09-14
Payer: COMMERCIAL

## 2022-09-14 VITALS
TEMPERATURE: 98.9 F | HEIGHT: 63 IN | SYSTOLIC BLOOD PRESSURE: 108 MMHG | OXYGEN SATURATION: 97 % | WEIGHT: 145 LBS | DIASTOLIC BLOOD PRESSURE: 70 MMHG | HEART RATE: 74 BPM | BODY MASS INDEX: 25.69 KG/M2

## 2022-09-14 DIAGNOSIS — E78.5 DYSLIPIDEMIA: ICD-10-CM

## 2022-09-14 DIAGNOSIS — R93.1 AGATSTON CORONARY ARTERY CALCIUM SCORE LESS THAN 100: Primary | ICD-10-CM

## 2022-09-14 PROBLEM — I73.9 CLAUDICATION (HCC): Status: RESOLVED | Noted: 2021-05-20 | Resolved: 2022-09-14

## 2022-09-14 PROCEDURE — 99213 OFFICE O/P EST LOW 20 MIN: CPT | Performed by: INTERNAL MEDICINE

## 2022-09-14 PROCEDURE — 93000 ELECTROCARDIOGRAM COMPLETE: CPT | Performed by: INTERNAL MEDICINE

## 2022-09-14 NOTE — ASSESSMENT & PLAN NOTE
Fairly low risk considering her risk factors including 40 pack-year smoking  Fortunately she has quit smoking  Not keen on statin therapy

## 2022-09-14 NOTE — PATIENT INSTRUCTIONS
Your LDL cholesterol is improved to 152  Continue low-cholesterol diet and regular exercise  The calcium score was very low at 5  Continue abstinence from tobacco use  Cardiac risk can be lowered with increase in physical activity, plant-based or Mediterranean style start diet, and avoidance of tobacco products  Report any worsening cardiac symptoms (chest pain,shortness of breath with exertion or fainting)  [address] diet: A heart-healthy eating plan    What is the Mediterranean diet? The Mediterranean diet is a way of eating based on the traditional cuisine of countries bordering [facility]  While there is no single definition of the Mediterranean diet, it is typically high in vegetables, fruits, whole grains, beans, nut and seeds, and olive oil  The main components of Mediterranean diet include:    Daily consumption of vegetables, fruits, whole grains and healthy fats  Weekly intake of fish, poultry, beans and eggs  Moderate portions of dairy products  Limited intake of red meat  Other important elements of the Mediterranean diet are sharing meals with family and friends    Plant based, not meat based  The foundation of the Mediterranean diet is vegetables, fruits, herbs, nuts, beans and whole grains  Meals are built around these plant-based foods  Small amounts of dairy, poultry and eggs are also central to the Mediterranean Diet, as is seafood  In contrast, red meat is eaten only occasionally  Healthy fats  Healthy fats are a mainstay of the Mediterranean diet  They're eaten instead of less healthy fats, such as saturated and trans fats, which contribute to heart disease  Olive oil is the primary source of added fat in the Mediterranean diet  Olive oil provides monounsaturated fat, which has been found to lower total cholesterol and low-density lipoprotein (LDL or "bad") cholesterol levels  Nuts and seeds also contain monounsaturated fat      Fish are also important in the [address]  Fatty fish -- such as mackerel, herring, sardines, albacore tuna, salmon and lake trout -- are rich in omega-3 fatty acids, a type of polyunsaturated fat that may reduce inflammation in the body  Omega-3 fatty acids also help decrease triglycerides, reduce blood clotting, and decrease the risk of stroke and heart failure  Eating the [address] way  Interested in trying the [address]? These tips will help you get started:    Eat more fruits and vegetables  Aim for 7 to 10 servings a day of fruit and vegetables  Opt for whole grains  Switch to whole-grain bread, cereal and pasta  [place] with other whole grains  Use healthy fats  Try olive oil as a replacement for butter when cooking  Instead of putting butter or margarine on bread, try dipping it in flavored olive oil  Eat more seafood  Eat fish twice a week  Fresh or water-packed tuna, salmon, trout, mackerel and herring are healthy choices  Grilled fish tastes good and requires little cleanup  Avoid deep-fried fish  Reduce red meat  Substitute fish, poultry or beans for meat  If you eat meat, make sure it's lean and keep portions small  Spice it up  Herbs and spices boost flavor and lessen the need for salt  The Mediterranean diet is a delicious and healthy way to eat  Many people who switch to this style of eating say they'll never eat any other way      Source: http://www hema-karen org/

## 2022-09-14 NOTE — PROGRESS NOTES
[facility] [place] [address] [address]  Phone#  [phone]  Fax#  [phone]  [facility] Follow-up Visit             NAME: Guy Gerardo  AGE: 59 y o  SEX: female   : 1965   MRN: [id number]    DATE: 2022  TIME: 3:03 PM    Assessment and plan:    Dyslipidemia  Lab Results   Component Value Date    LDLCALC 152 (H) 2022   Previous LDL was over 180 and statin therapy was advised but she did not start that  Calcium score is minimally elevated at 5  Images reviewed  Implications discussed  Currently being managed medically with diet and exercise due to overall low ASCVD risk  The 10-year ASCVD risk score (Nacho Aflred et al , 2013) is: 2 4%    Values used to calculate the score:      Age: 59 years      Sex: Female      Is Non- : No      Diabetic: No      Tobacco smoker: No      Systolic Blood Pressure: 176 mmHg      Is BP treated: No      HDL Cholesterol: 50 mg/dL      Total Cholesterol: 237 mg/dL          Agatston coronary artery calcium score less than 100  Fairly low risk considering her risk factors including 40 pack-year smoking  Fortunately she has quit smoking  Not keen on statin therapy  Chief Complaint   Patient presents with    Follow-up     No complaints    Dyslipidemia    HPI:    Guy Gerardo is a 59y o -year-old female who presents to the cardiology clinic for follow up  Previous evaluation revealed minimal coronary artery calcification with a calcium score of only 5  She has marked hyperlipidemia and was placed on statins in   She returns to the clinic for follow-up  She is a former smoker and has a 40 pack-year smoking history, quit smoking about 7 months ago  Recent primary care notes reviewed  Though she was prescribed statins, she did not start taking them  Her LDL is now down to 152  Previous LDL was over 180  Now exercising regularly  Has changed diet   Eating more salads, fruits and oatmeal     Patient is doing well from a cardiovascular standpoint  Denies recent hospitalizations  Current medications reviewed  Reports compliance to medicines  No side effects reported  Denies chest pain on exertion  Denies worsening shortness of breath  Denies sustained palpitations  Cardiology Problem list:  Calcium score 5  Severe dyslipidemia: Statin advised 06/04/2021, did not start  GERD   Leg pain:  6/21: Rest-stress THAO are normal  Tobacco use:  Quit 1/22    Past history, family history, social history, current medications, vital signs, recent lab and imaging studies and  prior cardiology studies reviewed independently on this visit  BP Readings from Last 4 Encounters:   09/14/22 108/70   08/24/22 102/70   05/20/21 112/72   07/30/19 100/70      Wt Readings from Last 3 Encounters:   09/14/22 65 8 kg (145 lb)   08/24/22 65 8 kg (145 lb)   05/20/21 61 1 kg (134 lb 9 6 oz)       Lab Results   Component Value Date    LDLCALC 152 (H) 09/09/2022     Lab Results   Component Value Date    CREATININE 0 97 09/09/2022        ALLERGIES:  No Known Allergies      Current Outpatient Medications:     ALPRAZolam (XANAX) 1 mg tablet, 1 mg 2 (two) times a day, Disp: , Rfl:     fluticasone (FLONASE) 50 mcg/act nasal spray, SPRAY 2 SPRAYS INTO EACH NOSTRIL EVERY DAY, Disp: , Rfl:     meloxicam (MOBIC) 7 5 mg tablet, , Disp: , Rfl:     omeprazole (PriLOSEC) 20 mg delayed release capsule, Take 20 mg by mouth daily before breakfast, Disp: , Rfl:     Review of Systems   Constitutional: Negative for fever  Respiratory: Negative for chest tightness, shortness of breath and wheezing  Cardiovascular: Negative for chest pain, palpitations and leg swelling  Skin: Negative for rash  Neurological: Negative for syncope  Hematological: Does not bruise/bleed easily  Past Medical History:   Diagnosis Date    Insomnia      History reviewed  No pertinent surgical history    Family History Problem Relation Age of Onset    Brain cancer Mother     Depression Sister 48    Stroke Sister     Breast cancer Sister 39        breast    AVM Sister        Social History   reports that she quit smoking about 6 months ago  Her smoking use included cigarettes  She has a 60 00 pack-year smoking history  She has never used smokeless tobacco  She reports previous alcohol use  She reports that she does not use drugs  Objective:     Vitals:    09/14/22 1440   BP: 108/70   Pulse: 74   Temp: 98 9 °F (37 2 °C)   SpO2: 97%     Wt Readings from Last 3 Encounters:   09/14/22 65 8 kg (145 lb)   08/24/22 65 8 kg (145 lb)   05/20/21 61 1 kg (134 lb 9 6 oz)     Pulse Readings from Last 3 Encounters:   09/14/22 74   08/24/22 88   05/20/21 78     BP Readings from Last 3 Encounters:   09/14/22 108/70   08/24/22 102/70   05/20/21 112/72       Physical Exam  Constitutional:       General: She is not in acute distress  Cardiovascular:      Rate and Rhythm: Normal rate and regular rhythm  Heart sounds: S1 normal and S2 normal  No murmur heard  Pulmonary:      Breath sounds: No wheezing or rhonchi  Musculoskeletal:      Right lower leg: No edema  Left lower leg: No edema  Neurological:      Mental Status: Mental status is at baseline     Psychiatric:         Mood and Affect: Mood normal          Pertinent Laboratory/Diagnostic Studies:    Laboratory studies reviewed personally by Wilian López MD    BMP:   Lab Results   Component Value Date    SODIUM 139 09/09/2022    K 4 2 09/09/2022     09/09/2022    CO2 28 09/09/2022    BUN 14 09/09/2022    CREATININE 0 97 09/09/2022    CALCIUM 9 9 09/09/2022     CBC:  Lab Results   Component Value Date    WBC 4 62 09/09/2022    RBC 4 28 09/09/2022    HGB 14 1 09/09/2022    HCT 41 8 09/09/2022    MCV 98 09/09/2022    MCH 32 9 09/09/2022    RDW 12 0 09/09/2022     09/09/2022     Coags:    Lipid Profile:   No results found for: CHOL  Lab Results   Component Value Date    HDL 50 09/09/2022     Lab Results   Component Value Date    LDLCALC 152 (H) 09/09/2022     Lab Results   Component Value Date    TRIG 177 (H) 09/09/2022      Lab Results   Component Value Date    SVO2GPPQPDKU 1 180 09/09/2022     Lab Results   Component Value Date    ALT 73 09/09/2022    AST 30 09/09/2022       Imaging Studies:     No results found  Yevgeniy Masterson MD, [facility]    Portions of the record may have been created with voice recognition software  Occasional wrong word or "sound a like" substitutions may have occurred due to the inherent limitations of voice recognition software  Read the chart carefully and recognize, using context, where substitutions have occurred

## 2022-09-14 NOTE — ASSESSMENT & PLAN NOTE
Lab Results   Component Value Date    LDLCALC 152 (H) 09/09/2022   Previous LDL was over 180 and statin therapy was advised but she did not start that  Calcium score is minimally elevated at 5  Images reviewed  Implications discussed  Currently being managed medically with diet and exercise due to overall low ASCVD risk      The 10-year ASCVD risk score (Alfredo Lee et al , 2013) is: 2 4%    Values used to calculate the score:      Age: 59 years      Sex: Female      Is Non- : No      Diabetic: No      Tobacco smoker: No      Systolic Blood Pressure: 801 mmHg      Is BP treated: No      HDL Cholesterol: 50 mg/dL      Total Cholesterol: 237 mg/dL

## 2022-09-22 ENCOUNTER — HOSPITAL ENCOUNTER (OUTPATIENT)
Dept: RADIOLOGY | Facility: MEDICAL CENTER | Age: 57
Discharge: HOME | End: 2022-09-22
Payer: COMMERCIAL

## 2022-09-22 DIAGNOSIS — Z87.891 FORMER SMOKER: ICD-10-CM

## 2022-09-22 PROCEDURE — 71271 CT THORAX LUNG CANCER SCR C-: CPT

## 2022-09-28 ENCOUNTER — OFFICE VISIT (OUTPATIENT)
Dept: OBGYN CLINIC | Facility: CLINIC | Age: 57
End: 2022-09-28
Payer: COMMERCIAL

## 2022-09-28 VITALS
BODY MASS INDEX: 25.34 KG/M2 | SYSTOLIC BLOOD PRESSURE: 122 MMHG | WEIGHT: 143 LBS | DIASTOLIC BLOOD PRESSURE: 70 MMHG | HEIGHT: 63 IN

## 2022-09-28 DIAGNOSIS — Z12.4 ENCOUNTER FOR SCREENING FOR MALIGNANT NEOPLASM OF CERVIX: ICD-10-CM

## 2022-09-28 DIAGNOSIS — Z01.419 WELL WOMAN EXAM WITH ROUTINE GYNECOLOGICAL EXAM: Primary | ICD-10-CM

## 2022-09-28 DIAGNOSIS — Z11.51 SCREENING FOR HPV (HUMAN PAPILLOMAVIRUS): ICD-10-CM

## 2022-09-28 PROCEDURE — G0145 SCR C/V CYTO,THINLAYER,RESCR: HCPCS | Performed by: OBSTETRICS & GYNECOLOGY

## 2022-09-28 PROCEDURE — S0610 ANNUAL GYNECOLOGICAL EXAMINA: HCPCS | Performed by: OBSTETRICS & GYNECOLOGY

## 2022-09-28 PROCEDURE — G0476 HPV COMBO ASSAY CA SCREEN: HCPCS | Performed by: OBSTETRICS & GYNECOLOGY

## 2022-09-28 NOTE — PROGRESS NOTES
ASSESSMENT & PLAN:   Diagnoses and all orders for this visit:    Well woman exam with routine gynecological exam  -     Liquid-based pap, screening    Screening for HPV (human papillomavirus)  -     Liquid-based pap, screening    Encounter for screening for malignant neoplasm of cervix  -     Liquid-based pap, screening          The following were reviewed in today's visit: ASCCP guidelines, Gardisil vaccination, STD testing breast self exam, mammography screening ordered, use and side effects of HRT, menopause, exercise and healthy diet  Patient to return to office in yearly for annual exam      All questions have been answered to her satisfaction  CC:  Annual Gynecologic Examination  Chief Complaint   Patient presents with    Gynecologic Exam     PT is here to establish care and for her annual exam and pap smear  No pap on file; states it has been a few years  Pt had a mammogram prior to Covid  She is doing well, no complaints  HPI: Neeraj Myrick is a 59 y o  Anthony Cornet who presents for annual gynecologic examination  She has the following concerns:  Last pap 3 years ago  Health Maintenance:    Exercise: occasionally  Breast exams/breast awareness: no  Diet: well balanced diet  Last mammogram: scheduled   Colorectal cancer screening: has Cologuard      Past Medical History:   Diagnosis Date    Acid reflux     Insomnia        History reviewed  No pertinent surgical history  Past OB/Gyn History:   No LMP recorded  Patient is postmenopausal     Menopausal status: postmenopausal  Menopausal symptoms: None    Last Pap: 2019 : no abnormalities  History of abnormal Pap smear: once, repeat was normal     Patient is currently sexually active     STD testing: no  Current contraception: none      Family History  Family History   Problem Relation Age of Onset    Brain cancer Mother     No Known Problems Father     Depression Sister 48    Stroke Sister     Breast cancer Sister 39        breast    AVM Sister     No Known Problems Son        Family history of uterine or ovarian cancer: no  Family history of breast cancer: yes  Family history of colon cancer: no    Social History:  Social History     Socioeconomic History    Marital status: /Civil Union     Spouse name: Not on file    Number of children: Not on file    Years of education: Not on file    Highest education level: Not on file   Occupational History    Not on file   Tobacco Use    Smoking status: Former Smoker     Packs/day: 1 50     Years: 40 00     Pack years: 60 00     Types: Cigarettes     Quit date: 2022     Years since quittin 5    Smokeless tobacco: Never Used   Vaping Use    Vaping Use: Never used   Substance and Sexual Activity    Alcohol use: Not Currently    Drug use: No    Sexual activity: Yes     Partners: Male     Birth control/protection: Post-menopausal   Other Topics Concern    Not on file   Social History Narrative    Drinks coffee/tea 2 cups daily        Retired - [employer] in [employer] from [employer] 2 2 miles daily    1 child     Social Determinants of Health     Financial Resource Strain: Not on file   Food Insecurity: Not on file   Transportation Needs: Not on file   Physical Activity: Not on file   Stress: Not on file   Social Connections: Not on file   Intimate Partner Violence: Not on file   Housing Stability: Not on file     Domestic violence screen: negative    Allergies:  No Known Allergies    Medications:    Current Outpatient Medications:     ALPRAZolam (XANAX) 1 mg tablet, 1 mg 2 (two) times a day, Disp: , Rfl:     meloxicam (MOBIC) 7 5 mg tablet, , Disp: , Rfl:     omeprazole (PriLOSEC) 20 mg delayed release capsule, Take 20 mg by mouth daily before breakfast, Disp: , Rfl:     Review of Systems:  Review of Systems   Constitutional: Negative for chills and fever  Respiratory: Negative for cough and shortness of breath      Cardiovascular: Negative for chest pain and palpitations  Gastrointestinal: Positive for abdominal distention (occasionaly) and abdominal pain  Negative for blood in stool, constipation, nausea and vomiting  Genitourinary: Positive for frequency  Negative for difficulty urinating, dyspareunia, dysuria, pelvic pain, urgency, vaginal bleeding, vaginal discharge and vaginal pain  Recent UTI     Neurological: Negative for headaches  Physical Exam:  /70   Ht 5' 3" (1 6 m)   Wt 64 9 kg (143 lb)   BMI 25 33 kg/m²    Physical Exam  Constitutional:       General: She is awake  Appearance: Normal appearance  She is well-developed  Genitourinary:      Vulva, bladder and urethral meatus normal       Right Labia: No rash, tenderness or lesions  Left Labia: No tenderness, lesions or rash  No labial fusion noted  No vaginal discharge, erythema, tenderness or bleeding  No vaginal prolapse present  Mild vaginal atrophy present  Right Adnexa: not tender, not full and no mass present  Left Adnexa: not tender, not full and no mass present  No cervical motion tenderness, discharge, lesion or polyp  Uterus is not enlarged, tender or irregular  No uterine mass detected  No urethral prolapse present  Bladder is not tender  Pelvic exam was performed with patient in the lithotomy position  Breasts:      Right: No inverted nipple, mass, nipple discharge, skin change, tenderness, axillary adenopathy or supraclavicular adenopathy  Left: No inverted nipple, mass, nipple discharge, skin change, tenderness, axillary adenopathy or supraclavicular adenopathy  HENT:      Head: Normocephalic and atraumatic  Cardiovascular:      Rate and Rhythm: Normal rate and regular rhythm  Heart sounds: Normal heart sounds  Pulmonary:      Effort: Pulmonary effort is normal  No tachypnea or respiratory distress  Breath sounds: Normal breath sounds     Abdominal:      General: Abdomen is flat  There is no distension  Palpations: Abdomen is soft  Tenderness: There is no abdominal tenderness  There is no guarding or rebound  Musculoskeletal:      Cervical back: Neck supple  Lymphadenopathy:      Upper Body:      Right upper body: No supraclavicular or axillary adenopathy  Left upper body: No supraclavicular or axillary adenopathy  Neurological:      General: No focal deficit present  Mental Status: She is alert and oriented to person, place, and time  Psychiatric:         Mood and Affect: Mood normal          Behavior: Behavior normal          Thought Content: Thought content normal          Judgment: Judgment normal    Vitals reviewed

## 2022-09-28 NOTE — PATIENT INSTRUCTIONS
VAGINAL DRYNESS OVERVIEW    Vaginal dryness is a common condition in postmenopausal women (ie, women who have been through menopause)  This condition is also common in women who have had both of their ovaries surgically removed, for example, to treat or prevent cancer  You may hear your health care provider use the term "atrophic vaginitis" or "genitourinary syndrome of menopause "    In some women, vaginal dryness leads to uncomfortable symptoms, such as pain with sex, burning vaginal discomfort or itching, or abnormal vaginal discharge  There may also be related urinary symptoms, such as frequent or painful urination  Other women do not notice bothersome symptoms at all  Fortunately, there are several effective treatments available  VAGINAL DRYNESS CAUSES    The hormone estrogen helps to keep the vagina moist, maintain thickness of the vaginal lining, and keep the tissue flexible (also called "elasticity")  Vaginal dryness occurs when your body produces a decreased amount of estrogen  This can be permanent or temporary and can occur at different times throughout life, such as:    ?At the time of menopause (one year after monthly periods stop)  ? After surgical removal of the ovaries, chemotherapy, or radiation therapy of the pelvis to treat cancer  ? After having a baby in women who are breastfeeding - Estrogen production returns to normal when breastfeeding becomes less frequent or is stopped  ? While using certain medications, such as danazol, medroxyprogesterone (brand names: Provera, Depo-Provera), leuprolide (brand name: Lupron), or nafarelin - When these medications are stopped, estrogen production usually returns to normal     VAGINAL DRYNESS TREATMENT    There are several treatment options for women with vaginal dryness  Some, such as vaginal moisturizers or lubricants, are available without a prescription   Others require a prescription, including a vaginal estrogen cream, tablet, capsule, or ring; an oral medication called ospemifene; and a vaginal tablet called prasterone  These treatments usually work temporarily; your symptoms will return when the treatment is stopped unless your ovaries start producing more estrogen again  Vaginal lubricants and moisturizers -- You can buy these without a prescription in most pharmacies  Vaginal lubricants and moisturizers do not contain any hormones and have virtually no systemic (body-wide) side effects  Possible local side effects include irritation or a burning feeling after application  ?Lubricants are designed to reduce friction and discomfort from dryness during sexual intercourse  The lubricant is applied inside the vagina and/or on the partner's penis or fingers just before sex  Products sold specifically as vaginal lubricants are more effective than lubricants that are not designed for this purpose, such as petroleum jelly  In addition, oil-based lubricants like petroleum jelly, baby oil, or mineral oil can damage latex condoms and/or diaphragms and make them less effective in preventing pregnancy or sexually transmitted infections  Lubricants that are made with water or silicones can be used with latex condoms and diaphragms  Polyurethane condoms can be used with oil-based products  Natural lubricants, such as olive, coconut, avocado, or peanut oil, are easily available products that may be used as a lubricant with sex  However, it is important to know that, like the oil-based lubricants, natural oils are not recommended for use with latex condoms or diaphragms, as they can damage the latex  Water- or silicone-based lubricants are a better choice if you use condoms or a diaphragm  ? Vaginal moisturizers are formulated to allow the vaginal tissues to retain moisture more effectively  Moisturizers are applied into the vagina approximately three times weekly to allow a continuous moisturizing effect   Be sure to check the label to ensure that you are purchasing a moisturizer and not a lubricant  Hand and body lotions and moisturizers should not be used to relieve vaginal dryness since they can be irritating to the vaginal tissues  Vaginal estrogen -- Vaginal estrogen is one of the most effective treatment options for vaginal dryness  Vaginal estrogen requires a prescription from your health care provider  Some women worry about possible side effects from hormones  Very low doses of estrogen can be used to treat vaginal dryness when it is in the form of a vaginal cream or insertable tablet, capsule, or ring  A small amount of estrogen is absorbed into the bloodstream with these vaginal forms, but when used regularly, the level of estrogen in the blood is in the same range as in postmenopausal women who are not using vaginal estrogen  As a result, there is a much lower risk of the side effects people sometimes worry about, such as blood clots, breast cancer, and heart attack, compared with other estrogen-containing products (eg, birth control pills, menopausal hormone therapy)  Several types of vaginal estrogen products are available:    ? Cream - Estrogen cream is measured with an applicator and inserted into the vagina (if inserting the applicator is uncomfortable, you can also use your finger)  The cream is usually inserted every day for two weeks and then one or two times weekly after that  With some doses, you may also need to a take another hormonal medication, called a progestin  This is to prevent the lining of your uterus from building up and becoming precancerous or cancerous  Ask your health care provider whether you need to take a progestin with the vaginal estrogen cream you are using  ?Tablet or capsule - The vaginal estrogen insert is a small tablet or capsule that you put in your vagina  It comes packaged in a disposable applicator  It is usually inserted every day for two weeks and then twice weekly after that   Some women find it uncomfortable to insert the applicator at first, when vaginal dryness is at its worst; if you have this problem, ask your health care provider if you should use another form of vaginal estrogen  ? Ring - The vaginal estrogen ring (brand name: Estring) is a flexible plastic ring you wear inside your vagina all the time  You replace it with a new ring every three months  The ring does not need to be removed during sex or bathing  It cannot be felt by most women or their sexual partners  In women who have previously had a hysterectomy, the ring will sometimes fall out  The estrogen ring used to treat vaginal dryness should not be confused with a different estrogen replacement vaginal ring (brand name: Miguel Teagan), which releases a much higher dose of estrogen  The estrogen in the higher dose ring is intended to be absorbed into the body to relieve hot flashes in women going through menopause  How long can I use vaginal estrogen? -- As low-dose vaginal estrogen is associated with few adverse effects, it can probably be used indefinitely, although there are no long-term studies to provide data about the effects over many years  Is vaginal estrogen safe if I have a history of breast cancer? -- The safety of vaginal estrogen in women who have a past history of breast cancer is debated  A small amount of estrogen can be absorbed from the vagina into the bloodstream  If you have a history of breast cancer, talk to your health care provider or oncologist about the potential risks and benefits of vaginal estrogen  Prasterone (dehydroepiandrosterone) -- Prasterone, also known as dehydroepiandrosterone (DHEA), is also an option for women with vaginal dryness due to menopause  It comes in the form of a suppository that you insert into your vagina once a day   Vaginal estrogen therapy is more commonly used than prasterone because vaginal estrogen has been studied more thoroughly and the dosing schedule may be more convenient  However, prasterone is an option for women who cannot take estrogen or prefer to avoid it, but who can use other vaginal hormones  Ospemifene -- Ospemifene is a prescription medication that is similar to estrogen but is not estrogen  In the vaginal tissue, it acts similarly to estrogen  It comes in a pill and is prescribed for women who want to use an estrogen-like medication for vaginal dryness but prefer not to use (or have trouble inserting) a vaginal medication  Ospemifene may cause hot flashes as a side effect; it may also increase the risk of blood clots or uterine cancer  Long-term studies of ospemifene are needed to evaluate the risk of these complications  This medication has not been tested in women who have had breast cancer or are at a high risk of developing breast cancer  Sexual activity -- Vaginal estrogen improves dryness quickly, usually within a few weeks  If sex is not uncomfortable, you may continue to have sex as you treat vaginal dryness  [place] may help the vaginal tissues by keeping them soft and stretchable  If sex continues to be painful despite treatment for vaginal dryness, talk to your health care provider  There may be other things you can try such as pelvic floor physical therapy  [employer]    What causes sexual problems in women? Some common causes of sexual problems in women include the following:    Aging--A womans libido (another term for interest in and desire for sex) and sexual activity sometimes decrease with age  This decrease is normal and usually is not a cause for concern, but problems can arise if one partner in a relationship desires sex more often than the other  Hormonal changes--Changes in hormones at certain times of a womans life may cause changes in her interest in or response to sex   For example, decreased estrogen levels (such as during perimenopause and menopause) may cause vaginal dryness and lead to pain during intercourse  Stress and anxiety  Relationship problems  Illness, including depression  Past negative sexual experiences    What are the types of sexual problems that affect women? Female sexual dysfunction is a general term for a problem with interest in or response to sex  Sexual problems fall into four groups, which often overlap:    Desire problems  Arousal problems  Orgasmic problems  Sexual pain disorder      What are desire problems? Lack of desire is the most common sexual concern reported by women  A lack of desire before having sex is normal for some women  They may not feel that they want to have sex until they begin to engage in sexual activity and become aroused  A lack of desire is considered a disorder when a woman does not want to engage in any type of sexual activity, including masturbation does not have (or has very few) sexual thoughts or fantasies is worried or concerned about these issues    What are arousal problems? Arousal is the name given to the physical and emotional changes that occur in the body as a result of sexual stimulation  Arousal can be affected by many things, including medications, alcohol, smoking, illegal drug use, and medical conditions  Anxiety, stress, problems with your partner, and past negative sexual experiences also can cause arousal difficulties  What are orgasmic problems? Not having an orgasm during sexual activity may not be a problem  Sharing love and closeness without having an orgasm is satisfying for many women  Other women may feel that not having an orgasm is a problem  They may want to find a solution  Women with orgasmic disorders may never have had an orgasm from sexual encounters, or they may have had orgasms at one time but no longer have them, despite healthy arousal  The intensity of orgasm may have decreased, which can occur with age      Orgasmic disorder may be caused by a poor body image or a fear of losing control  It also may occur when a woman does not trust her partner  It is common for women who do not have orgasms to have arousal problems  What is sexual pain disorder? Painful sex may be a lifelong or short-term condition  Pain that occurs during sexual activities other than intercourse is called noncoital sexual pain disorder   Pain during intercourse is called dyspareunia  Most sexually active women have had pain during sex at some point in their lives  If it occurs often or is severe, a woman should see her health care professional (see [id number] When Sex Is Painful)  Can certain substances affect sexual response? Smoking, alcohol, and drugs can affect sexual response for both women and men  Smoking can slow down blood flow in the sexual organs and cause arousal problems  Alcohol and drugs affect how your body responds  A good first step in addressing sexual problems is to stop or limit smoking and the use of drugs or alcohol  What can I do to enhance desire? Address and work toward resolving relationship concerns, stresses, and misunderstandings about sex as well as other issues that may be affecting you and your partner  Focus less on intercourse and more on intimacy  Improve your sex knowledge and skills  Make time for sexual activity and focus on enjoyment and pleasuring each other  What can I do to increase arousal?  Be well rested  Increase the time spent on foreplay  Try a vaginal lubricant for dryness  Do Kegel exercises (contract and relax pelvic muscles)  Do not smoke  What can help me have an orgasm? Increase sexual stimulation  Try sexual toys  Use mental imagery and fantasy  How can I minimize sexual pain? Try different positions or sexual activities that do not involve intercourse  Allow plenty of time for arousal before penetration  Use a lubricant  Empty your bladder before sex  Take a warm bath  Some resources:   http://[employer]/  [employer]/    ROSHNI Tired Woman's Guide to Passionate Sex: Reclaim Your Desire and Reignite Your Relationship by Cheryl He    Better Sex Through Mindfulness: How Women Can Cultivate Desire by Artur Clark     Becoming Cliterate: Why Orgasm [place] Matters--And How to Get It  by Dr Laura Gibbons

## 2022-09-29 LAB
HPV HR 12 DNA CVX QL NAA+PROBE: NEGATIVE
HPV16 DNA CVX QL NAA+PROBE: NEGATIVE
HPV18 DNA CVX QL NAA+PROBE: NEGATIVE

## 2022-09-30 NOTE — RESULT ENCOUNTER NOTE
Rama Bose,     Your HPV testing is negative  Your pap is still pending, and will be updated soon  Please contact the office at [phone] with any questions       Otto

## 2022-10-09 LAB
LAB AP GYN PRIMARY INTERPRETATION: NORMAL
Lab: NORMAL

## 2022-12-22 ENCOUNTER — HOSPITAL ENCOUNTER (OUTPATIENT)
Dept: MAMMOGRAPHY | Facility: HOSPITAL | Age: 57
Discharge: HOME | End: 2022-12-22
Attending: FAMILY MEDICINE

## 2022-12-22 VITALS — HEIGHT: 63 IN | BODY MASS INDEX: 25.35 KG/M2 | WEIGHT: 143.08 LBS

## 2022-12-22 DIAGNOSIS — Z12.31 OTHER SCREENING MAMMOGRAM: ICD-10-CM

## 2022-12-29 LAB — COLOGUARD RESULT REPORTABLE: NORMAL

## 2023-02-01 ENCOUNTER — HOSPITAL ENCOUNTER (OUTPATIENT)
Dept: MAMMOGRAPHY | Facility: CLINIC | Age: 58
Discharge: HOME | End: 2023-02-01

## 2023-02-01 VITALS — HEIGHT: 63 IN | WEIGHT: 143 LBS | BODY MASS INDEX: 25.34 KG/M2

## 2023-02-01 DIAGNOSIS — R92.8 ABNORMAL MAMMOGRAM: ICD-10-CM

## 2023-02-01 NOTE — PROGRESS NOTES
Met with patient and Dr Idris De La Rosa regarding recommendation for;    __X___ RIGHT ______LEFT      _____Ultrasound guided  __X____Stereotactic breast biopsy  __X___Verbalized understanding  Blood thinners:  No: __X___ Yes: ______ What:                 Biopsy teaching sheet given:  Yes: ___X___ No: ________    Pt given contact information and adv to call with any questions/needs

## 2023-02-06 ENCOUNTER — CONSULT (OUTPATIENT)
Dept: PAIN MEDICINE | Facility: CLINIC | Age: 58
End: 2023-02-06

## 2023-02-06 VITALS
WEIGHT: 150 LBS | BODY MASS INDEX: 26.57 KG/M2 | SYSTOLIC BLOOD PRESSURE: 108 MMHG | DIASTOLIC BLOOD PRESSURE: 82 MMHG | HEART RATE: 80 BPM

## 2023-02-06 DIAGNOSIS — M48.062 LUMBAR STENOSIS WITH NEUROGENIC CLAUDICATION: ICD-10-CM

## 2023-02-06 DIAGNOSIS — M79.18 MYOFASCIAL PAIN SYNDROME: ICD-10-CM

## 2023-02-06 DIAGNOSIS — M51.16 INTERVERTEBRAL DISC DISORDER WITH RADICULOPATHY OF LUMBAR REGION: Primary | ICD-10-CM

## 2023-02-06 RX ORDER — METHOCARBAMOL 750 MG/1
750 TABLET, FILM COATED ORAL
Qty: 30 TABLET | Refills: 2 | Status: SHIPPED | OUTPATIENT
Start: 2023-02-06 | End: 2023-05-07

## 2023-02-06 NOTE — PATIENT INSTRUCTIONS
Core Strengthening Exercises   AMBULATORY CARE:   What you need to know about core strengthening exercises: Your core includes the muscles of your lower back, hip, pelvis, and abdomen  Core strengthening exercises help heal and strengthen these muscles  This helps prevent another injury, and keeps your pelvis, spine, and hips in the correct position  Contact your healthcare provider if:   You have sharp or worsening pain during exercise or at rest     You have questions or concerns about your shoulder exercises  Safety tips:  Talk to your healthcare provider before you start an exercise program  A physical therapist can teach you how to do core strengthening exercises safely  Do the exercises on a mat or firm surface  A firm surface will support your spine and prevent low back pain  Do not do these exercises on a bed  Move slowly and smoothly  Avoid fast or jerky motions  Stop if you feel pain  Core exercises should not be painful  Stop if you feel pain  Breathe normally during core exercises  Do not hold your breath  This may cause an increase in blood pressure and prevent muscle strengthening  Your healthcare provider will tell you when to inhale and exhale during the exercise  Begin all of your exercises with abdominal bracing  Abdominal bracing helps warm up your core muscles  You can also practice abdominal bracing throughout the day  Lie on your back with your knees bent and feet flat on the floor  Place your arms in a relaxed position beside your body  Tighten your abdominal muscles  Pull your belly button in and up toward your spine  Hold for 5 seconds  Relax your muscles  Repeat 10 times  Core strengthening exercises: Your healthcare provider will tell you how often to do these exercises  The provider will also tell you how many repetitions of each exercise you should do  Hold each exercise for 5 seconds or as directed   As you get stronger, increase your hold to 10 to 15 seconds  You can do some of these exercises on a stability ball, or with a weight  Ask your healthcare provider how to use a stability ball or weight for these exercises:  Bridging:  Lie on your back with your knees bent and feet flat on the floor  Rest your arms at your side  Tighten your buttocks, and then lift your hips 1 inch off the floor  Hold for 5 seconds  When you can do this exercise without pain for 10 seconds, increase the distance you lift your hips  A good goal is to be able to lift your hips so that your shoulders, hips, and knees are in a straight line  Dead bug:  Lie on your back with your knees bent and feet flat on the floor  Place your arms in a relaxed position beside your body  Begin with abdominal bracing  Next, raise one leg, keeping your knee bent  Hold for 5 seconds  Repeat with the other leg  When you can do this exercise without pain for 10 to 15 seconds, you may raise one straight leg and hold  Repeat with the other leg  Quadruped:  Place your hands and knees on the floor  Keep your wrists directly below your shoulders and your knees directly below your hips  Pull your belly button in toward your spine  Do not flatten or arch your back  Tighten your abdominal muscles below your belly button  Hold for 5 seconds  When you can do this exercise without pain for 10 to 15 seconds, you may extend one arm and hold  Repeat on the other side  Side bridge exercises:      Standing side bridge:  Stand next to a wall and extend one arm toward the wall  Place your palm flat on the wall with your fingers pointing upward  Begin with abdominal bracing  Next, without moving your feet, slowly bend your arm to 90 degrees  Hold for 5 seconds  Repeat on the other side  When you can do this exercise without pain for 10 to 15 seconds, you may do the bent leg side bridge on the floor  Bent leg side bridge:  Lie on one side with your legs, hips, and shoulders in a straight line  Prop yourself up onto your forearm so your elbow is directly below your shoulder  Bend your knees back to 90 degrees  Begin with abdominal bracing  Next, lift your hips and balance yourself on your forearm and knees  Hold for 5 seconds  Repeat on the other side  When you can do this exercise without pain for 10 to 15 seconds, you may do the straight leg side bridge on the floor  Straight leg side bridge:  Lie on one side with your legs, hips, and shoulders in a straight line  Prop yourself up onto your forearm so your elbow is directly below your shoulder  Begin with abdominal bracing  Lift your hips off the floor and balance yourself on your forearm and the outside of your flexed foot  Do not let your ankle bend sideways  Hold for 5 seconds  Repeat on the other side  When you can do this exercise without pain for 10 to 15 seconds, ask your healthcare provider for more advanced exercises  Superman:  Lie on your stomach  Extend your arms forward on the floor  Tighten your abdominal muscles and lift your right hand and left leg off the floor  Hold this position  Slowly return to the starting position  Tighten your abdominal muscles and lift your left hand and right leg off the floor  Hold this position  Slowly return to the starting position  Clam:  Lie on your side with your knees bent  Put your bottom arm under your head to keep your neck in line  Put your top hand on your hip to keep your pelvis from moving  Put your heels together, and keep them together during this exercise  Slowly raise your top knee toward the ceiling  Then lower your leg so your knees are together  Repeat this exercise 10 times  Then switch sides and do the exercise 10 times with the other leg  Curl up:  Lie on your back with your knees bent and feet flat on the floor  Place your hands, palms down, underneath your lower back   Next, with your elbows on the floor, lift your shoulders and chest 2 to 3 inches off the floor  Keep your head in line with your shoulders  Hold this position  Slowly return to the starting position  Straight leg raises:  Lie on your back with one leg straight  Bend the other knee and place your foot flat on the floor  Tighten your abdominal muscles  Keep your leg straight and slowly lift it straight up 6 to 12 inches off the floor  Hold this position  Lower your leg slowly  Do as many repetitions as directed on this side  Repeat with the other leg  Plank:  Lie on your stomach  Bend your elbows and place your forearms flat on the floor  Lift your chest, stomach, and knees off the floor  Make sure your elbows are below your shoulders  Your body should be in a straight line  Do not let your hips or lower back sink to the ground  Squeeze your abdominal muscles together and hold for 15 seconds  To make this exercise harder, hold for 30 seconds or lift 1 leg at a time  Bicycles:  Lie on your back  Bend both knees and bring them toward your chest  Your calves should be parallel to the floor  Place the palms of your hands on the back of your head  Straighten your right leg and keep it lifted 2 inches off the floor  Raise your head and shoulders off the floor and twist towards your left  Keep your head and shoulders lifted  Bend your right knee while you straighten your left leg  Keep your left leg 2 inches off the floor  Twist your head and chest towards the left leg  Continue to straighten 1 leg at a time and twist        Follow up with your doctor as directed:  Write down your questions so you remember to ask them during your visits  © Copyright [employer] 2022 Information is for End User's use only and may not be sold, redistributed or otherwise used for commercial purposes  All illustrations and images included in CareNotes® are the copyrighted property of [employer]  or Ramila Olmstead   The above information is an  only   It is not intended as medical advice for individual conditions or treatments  Talk to your doctor, nurse or pharmacist before following any medical regimen to see if it is safe and effective for you

## 2023-02-06 NOTE — PROGRESS NOTES
Assessment  1  Intervertebral disc disorder with radiculopathy of lumbar region    2  Lumbar stenosis with neurogenic claudication    3  Myofascial pain syndrome        Plan  The patient's symptoms, history/physical are consistent with pain that is multifactorial in origin but predominately the result of of her spinal stenosis  At this time, given the weakness that she is experiencing in her left leg, I will order an updated MRI of the lumbar spine to evaluate  I will also order x-rays of the left hip/pelvis  I advised her we will call with the results and discuss treatment moving forward  For now, I will start her on methocarbamol 750 mg to help with spasms and which will hopefully allow her to sleep  My impressions and treatment recommendations were discussed in detail with the patient who verbalized understanding and had no further questions  Discharge instructions were provided  I personally saw and examined the patient and I agree with the above discussed plan of care  Orders Placed This Encounter   Procedures   • MRI lumbar spine without contrast     Standing Status:   Future     Standing Expiration Date:   2/6/2027     Scheduling Instructions: There is no preparation for this test  Please leave your jewelry and valuables at home, wedding rings are the exception  All patients will be required to change into a hospital gown and pants  Street clothes are not permitted in the MRI  Magnetic nail polish must be removed prior to arrival for your test  Please bring your insurance cards, a form of photo ID and a list of your medications with you  Arrive 15 minutes prior to your appointment time in order to register  Please bring any prior CT or MRI studies of this area that were not performed at a [facility]  To schedule this appointment, please contact Central Scheduling at [phone]              Prior to your appointment, please make sure you complete the MRI Screening Form when you e-Check in for your appointment  This will be available starting 7 days before your appointment in [address]  You may receive an e-mail with an activation code if you do not have a [employer] account  If you do not have access to a device, we will complete your screening at your appointment  Order Specific Question:   What is the patient's sedation requirement? Answer:   No Sedation     Order Specific Question:   Is the patient pregnant? Answer:   No     Order Specific Question:   Release to patient through [employer]     Answer:   Immediate     Order Specific Question:   Is order priority selected as STAT? Answer:   No     Order Specific Question:   Reason for Exam (FREE TEXT)     Answer:   lbp into left hip/leg with weakness   • XR hip/pelv 2-3 vws left if performed     Left lbp > right lbp     Standing Status:   Future     Standing Expiration Date:   2/6/2027     Scheduling Instructions:      Bring along any outside films relating to this procedure  Order Specific Question:   Is the patient pregnant? Answer:   No     New Medications Ordered This Visit   Medications   • methocarbamol (ROBAXIN) 750 mg tablet     Sig: Take 1 tablet (750 mg total) by mouth daily at bedtime as needed for muscle spasms     Dispense:  30 tablet     Refill:  2       History of Present Illness    Lidya Estevez is a 59 y o  female referred by Dr Elida Monroe for back pain that has been present chronically for years  Symptoms are currently moderate to severe rated 5/10 on a numeric rating scale, felt intermittently worst in the morning described to be sharp at times  Symptoms are aggravated with standing, bending, walking, coughing  Can radiate into the left hip/leg with some weakness treatment history has included prior injections which have not been helpful  Heat/ice provides moderate relief  Uses Tylenol without much help      I have personally reviewed and/or updated the patient's past medical history, past surgical history, family history, social history, current medications, allergies, and vital signs today  Review of Systems   Constitutional: Negative for fever and unexpected weight change  HENT: Negative for trouble swallowing  Eyes: Negative for visual disturbance  Respiratory: Negative for shortness of breath and wheezing  Cardiovascular: Negative for chest pain and palpitations  Gastrointestinal: Negative for constipation, diarrhea, nausea and vomiting  Endocrine: Negative for cold intolerance, heat intolerance and polydipsia  Genitourinary: Negative for difficulty urinating and frequency  Musculoskeletal: Positive for back pain and joint swelling  Negative for arthralgias, gait problem and myalgias  Skin: Negative for rash  Neurological: Negative for dizziness, seizures, syncope, weakness and headaches  Hematological: Does not bruise/bleed easily  Psychiatric/Behavioral: Negative for dysphoric mood  All other systems reviewed and are negative  Patient Active Problem List   Diagnosis   • Intervertebral disc disorder with radiculopathy of lumbar region   • Lumbar stenosis with neurogenic claudication   • Coccyxdynia   • Other constipation   • Vitamin D deficiency   • Left upper quadrant pain   • Dyslipidemia   • Agatston coronary artery calcium score less than 100       Past Medical History:   Diagnosis Date   • Acid reflux    • Insomnia        No past surgical history on file      Family History   Problem Relation Age of Onset   • Brain cancer Mother 40   • No Known Problems Father    • Depression Sister 48   • Stroke Sister    • Breast cancer Sister 39        breast   • AVM Sister    • No Known Problems Sister    • No Known Problems Brother    • No Known Problems Son    • Breast cancer Half-Sister         unknown age       Social History     Occupational History   • Not on file   Tobacco Use   • Smoking status: Former     Packs/day: 1 50     Years: 40 00     Pack years: 60  00     Types: Cigarettes     Quit date: 2022     Years since quittin 9   • Smokeless tobacco: Never   Vaping Use   • Vaping Use: Never used   Substance and Sexual Activity   • Alcohol use: Not Currently   • Drug use: No   • Sexual activity: Yes     Partners: Male     Birth control/protection: Post-menopausal       Current Outpatient Medications on File Prior to Visit   Medication Sig   • ALPRAZolam (XANAX) 1 mg tablet 1 mg 2 (two) times a day   • [DISCONTINUED] meloxicam (MOBIC) 7 5 mg tablet    • [DISCONTINUED] omeprazole (PriLOSEC) 20 mg delayed release capsule Take 20 mg by mouth daily before breakfast     No current facility-administered medications on file prior to visit  No Known Allergies    Physical Exam    /82   Pulse 80   Wt 68 kg (150 lb)   BMI 26 57 kg/m²     Constitutional: normal, well developed, well nourished, alert, in no distress and non-toxic and no overt pain behavior  Eyes: anicteric  HEENT: grossly intact  Neck: supple, symmetric, trachea midline and no masses   Pulmonary:even and unlabored  Cardiovascular:No edema or pitting edema present  Skin:Normal without rashes or lesions and well hydrated  Psychiatric:Mood and affect appropriate  Neurologic:Cranial Nerves II-XII grossly intact  Musculoskeletal:normal     Lumbar Spine Exam  Appearance:  Normal lordosis  Palpation/Tenderness:  left lumbar paraspinal tenderness   Range of Motion:  Flexion:   Moderately limited  with pain  Extension:  Moderately limited  with pain  Motor Strength:  Left hip flexion:  4/5  Left hip extension:  5/5  Right hip flexion:  5/5  Right hip extension:  5/5  Left knee flexion:  5/5  Left knee extension:  5/5  Right knee flexion:  5/5  Right knee extension:  5/5  Left foot dorsiflexion:  5/5  Left foot plantar flexion:  5/5  Right foot dorsiflexion:  5/5  Right foot plantar flexion:  5/5  Reflexes:  Left Patellar:  1+   Right Patellar:  1+   Left Achilles:  1+   Right Achilles:  1+   Special Tests:  Left Straight Leg Test:  positive  Right Straight Leg Test:  negative  Left Eliceo's Maneuver:  positive  Right Eliceo's Maneuver:  negative    Imaging    MRI Lumbar Spine (9/7/2018)     L4-5:  Left neural foraminal annular tear  Moderate spinal stenosis at L4-5 with impingement of the L4 nerve roots    Impingement of the L5 nerve roots within the lateral recesses

## 2023-02-07 ENCOUNTER — TELEPHONE (OUTPATIENT)
Dept: MAMMOGRAPHY | Facility: CLINIC | Age: 58
End: 2023-02-07

## 2023-02-08 ENCOUNTER — TELEPHONE (OUTPATIENT)
Dept: MAMMOGRAPHY | Facility: CLINIC | Age: 58
End: 2023-02-08

## 2023-02-08 ENCOUNTER — HOSPITAL ENCOUNTER (OUTPATIENT)
Dept: MAMMOGRAPHY | Facility: CLINIC | Age: 58
Discharge: HOME | End: 2023-02-08

## 2023-03-09 ENCOUNTER — HOSPITAL ENCOUNTER (OUTPATIENT)
Dept: MAMMOGRAPHY | Facility: CLINIC | Age: 58
Discharge: HOME | End: 2023-03-09

## 2023-03-09 VITALS — HEART RATE: 70 BPM | DIASTOLIC BLOOD PRESSURE: 67 MMHG | SYSTOLIC BLOOD PRESSURE: 110 MMHG

## 2023-03-09 DIAGNOSIS — R92.8 ABNORMAL MAMMOGRAM: ICD-10-CM

## 2023-03-09 RX ORDER — LIDOCAINE HYDROCHLORIDE AND EPINEPHRINE 20; 5 MG/ML; UG/ML
10 INJECTION, SOLUTION EPIDURAL; INFILTRATION; INTRACAUDAL; PERINEURAL ONCE
Status: COMPLETED | OUTPATIENT
Start: 2023-03-09 | End: 2023-03-09

## 2023-03-09 RX ORDER — LIDOCAINE HYDROCHLORIDE 10 MG/ML
5 INJECTION, SOLUTION EPIDURAL; INFILTRATION; INTRACAUDAL; PERINEURAL ONCE
Status: COMPLETED | OUTPATIENT
Start: 2023-03-09 | End: 2023-03-09

## 2023-03-09 RX ADMIN — LIDOCAINE HYDROCHLORIDE 5 ML: 10 INJECTION, SOLUTION EPIDURAL; INFILTRATION; INTRACAUDAL; PERINEURAL at 09:18

## 2023-03-09 RX ADMIN — LIDOCAINE HYDROCHLORIDE AND EPINEPHRINE 10 ML: 20; 5 INJECTION, SOLUTION EPIDURAL; INFILTRATION; INTRACAUDAL; PERINEURAL at 09:18

## 2023-03-09 NOTE — DISCHARGE INSTR - OTHER ORDERS
POST LARGE CORE BREAST BIOPSY PATIENT INFORMATION      Place an ice pack inside your bra over the top of the dressing every hour for 20 minutes (20 minutes on, 60 minutes off)  Do this until bedtime  Do not shower or bathe until the following morning  After bathing, you may remove the bandaid  You may bathe your breast carefully with the steri-strips in place  Be careful not to loosen them  The steri-strips will fall off in 3-5 days  You may have mild discomfort, and you may have some bruising where the needle entered the skin  This should clear within 5-7 days  If you need medicine for discomfort, take acetaminophen products such as Tylenol  You may also take Advil or Motrin products  DO NOT use Aspirin for the first 24 hours  Do not participate in strenuous activities such as-tennis, aerobics, weight lifting, etc  for 24 hours  Refrain from swimming/soaking for 72 hours  Wearing a bra for sleeping may be more comfortable for the first 24-48 hours after your biopsy  Watch for continued bleeding, pain or fever over 101  If any of these symptoms occur, please contact our breast nurse navigator at the location where your biopsy was performed  During normal business hours (7:30am - 4:00pm) please call the nurse navigator at the site     where your procedure was performed:       [address]: [phone] or [phone] [address]: [phone] or [phone]     [address]: [address] [facility]: [address]: [phone]        After 4pm - please call your physician or go to the nearest Emergency Department location  The final results of your biopsy are usually available within one week

## 2023-03-09 NOTE — PROGRESS NOTES
Procedure type:    _____ultrasound guided __x___stereotactic    Breast:    _____Left __x___Right    Location: 10 o'clock 8 cmfn    Needle: 8 gauge mammotome     # of passes: 6 ( 4 with calcifications, 2 without calcifications )     Clip: Mammotome barrel    Performed by: Dr Kamini Rashid held for 5 minutes by: Lashon Du Strips:    ___x__yes _____no    Band aid:    ___x__yes_____no    Tape and guaze:    _____yes ___x__no    Tolerated procedure:    ___x__yes _____no

## 2023-03-09 NOTE — PROGRESS NOTES
Ice pack given:    __x___yes _____no    Discharge instructions signed by patient:    __x___yes _____no    Discharge instructions given to patient:    ___x__yes _____no    Discharged via:    ___x__ambulatory    _____wheelchair    _____stretcher    Stable on discharge:    ___x__yes ____no  Patient would like results over the phone  Ok to leave a message

## 2023-03-10 NOTE — PROGRESS NOTES
Post procedure call completed    Bleeding: _____yes __X___no, pt denies    Pain: _____yes ___X___no, pt denies @ this time, did take Tylenol 3/9 with relief    Redness/Swelling: ______yes ___X___no, pt denies    Band aid removed: _____yes ___X__no (discussed removing when she showers)    Steri-Strips intact: ___X___yes _____no (discussed with patient to remove steri strips on 3/14 if they have not come off on their own)    Pt with no questions at this time, adv will call when results available, adv to call with any questions or concerns, has name/# for contact

## 2023-03-13 ENCOUNTER — TELEPHONE (OUTPATIENT)
Dept: MAMMOGRAPHY | Facility: CLINIC | Age: 58
End: 2023-03-13

## 2023-03-14 ENCOUNTER — CONSULT (OUTPATIENT)
Dept: GASTROENTEROLOGY | Facility: CLINIC | Age: 58
End: 2023-03-14

## 2023-03-14 VITALS
WEIGHT: 146 LBS | BODY MASS INDEX: 25.87 KG/M2 | DIASTOLIC BLOOD PRESSURE: 77 MMHG | OXYGEN SATURATION: 98 % | SYSTOLIC BLOOD PRESSURE: 101 MMHG | HEART RATE: 78 BPM | HEIGHT: 63 IN

## 2023-03-14 DIAGNOSIS — R10.13 EPIGASTRIC ABDOMINAL PAIN: ICD-10-CM

## 2023-03-14 DIAGNOSIS — Z12.11 COLON CANCER SCREENING: ICD-10-CM

## 2023-03-14 DIAGNOSIS — K59.09 OTHER CONSTIPATION: Primary | ICD-10-CM

## 2023-03-14 DIAGNOSIS — R10.12 LEFT UPPER QUADRANT PAIN: ICD-10-CM

## 2023-03-14 RX ORDER — PANTOPRAZOLE SODIUM 40 MG/1
40 TABLET, DELAYED RELEASE ORAL DAILY
Qty: 30 TABLET | Refills: 3 | Status: SHIPPED | OUTPATIENT
Start: 2023-03-14

## 2023-03-14 RX ORDER — LATANOPROST 50 UG/ML
SOLUTION/ DROPS OPHTHALMIC
COMMUNITY
Start: 2023-02-24

## 2023-03-14 NOTE — PROGRESS NOTES
Consultation - [facility]  Fran Robbins 59 y o  female MRN: [id number]          Assessment & Plan:  42-year-old female, several years of epigastric, left upper quadrant pain, chronic constipation  1   Epigastric pain: Suspect peptic ulcer disease versus reflux, she had apparently reflux confirmed on outside barium study  -Recommended daily PPI therapy  -Recommend proceeding with an upper endoscopy to evaluate for any significant reflux esophagitis, peptic ulcer disease    2  Left upper quadrant pain: Most likely secondary to chronic constipation  -We will check an x-ray of her abdomen pelvis  -Pending results of x-ray, can consider daily MiraLAX    3 colon cancer screening: Patient has never had a colonoscopy  -Recommend proceeding with a colonoscopy to exclude luminal pathology  -Discussed with the risks of procedures including bleeding, surgery, perforation, missed bowel detection rate    Lidya was seen today for abdominal pain  Diagnoses and all orders for this visit:    Other constipation    Epigastric abdominal pain  -     XR abdomen 1 view kub; Future  -     EGD; Future  -     pantoprazole (PROTONIX) 40 mg tablet; Take 1 tablet (40 mg total) by mouth daily    Left upper quadrant pain  -     XR abdomen 1 view kub; Future    Colon cancer screening  -     Colonoscopy; Future    Other orders  -     Diet NPO; Sips with meds; Standing  -     Void on call to OR; Standing            _____________________________________________________________        CC: Epigastric and left upper quadrant pain    HPI:  Fran Robbins is a 59 y  o female who was referred for evaluation of epigastric and left upper quadrant pain visit  This is a 42-year-old female, has a history of chronic constipation with bowel movements every 2 to 4 days, she reports that she wakes up in the morning with epigastric discomfort, better after eating, she also has left upper quadrant pain    We had seen her in the past for similar symptoms at that time recommended colonoscopy, the patient was nervous and did not proceed with colonoscopy and has not been seen since then  She did quit smoking last couple years  She has gained a few pounds  She notes that sometimes her abdominal pains tend to get worse with bowel movements  She denies any nausea, vomiting and as noted above some of the pain is actually better with eating  She does report some indigestion, was seen by ENT for burning in her throat  At that time she had a barium study at outside institution which demonstrated reflux but was not placed on any medications  She denies any dysphagia  Her weight has been stable  She reports some abdominal bloating in general     She takes Tylenol regularly but denies any NSAIDs  Medical history is otherwise unremarkable  Surgical history is notable for breast biopsy  She quit smoking, denies any alcohol complication retired from [employer]  Prior family history is noted for breast cancer in 2 sisters, brain cancer in her mother  She has difficulty with sleep  ROS:  The remainder of the ROS was negative except for the pertinent positives mentioned in HPI  Allergies: Patient has no known allergies      Medications:   Current Outpatient Medications:   •  ALPRAZolam (XANAX) 1 mg tablet, 1 mg 2 (two) times a day, Disp: , Rfl:   •  latanoprost (XALATAN) 0 005 % ophthalmic solution, INSTILL 1 DROP INTO BOTH EYES AT BEDTIME, Disp: , Rfl:   •  methocarbamol (ROBAXIN) 750 mg tablet, Take 1 tablet (750 mg total) by mouth daily at bedtime as needed for muscle spasms, Disp: 30 tablet, Rfl: 2  •  pantoprazole (PROTONIX) 40 mg tablet, Take 1 tablet (40 mg total) by mouth daily, Disp: 30 tablet, Rfl: 3'    Past Medical History:   Diagnosis Date   • Acid reflux    • Insomnia        Past Surgical History:   Procedure Laterality Date   • MAMMO STEREOTACTIC BREAST BIOPSY RIGHT (ALL INC) Right 3/9/2023       Family History   Problem Relation Age of Onset   • Brain cancer Mother 40   • No Known Problems Father    • Depression Sister 48   • Stroke Sister    • Breast cancer Sister 39        breast   • AVM Sister    • No Known Problems Sister    • No Known Problems Brother    • No Known Problems Son    • Breast cancer Half-Sister         unknown age        reports that she quit smoking about 12 months ago  Her smoking use included cigarettes  She has a 60 00 pack-year smoking history  She has never used smokeless tobacco  She reports that she does not currently use alcohol  She reports current drug use  Drug: Marijuana            Physical Exam:     /77 (BP Location: Left arm, Patient Position: Sitting, Cuff Size: Standard)   Pulse 78   Ht 5' 3" (1 6 m)   Wt 66 2 kg (146 lb)   SpO2 98%   BMI 25 86 kg/m²     Gen: wn/wd, NAD, healthy-appearing female  HEENT: anicteric, MMM, no cervical LAD  CVS: RRR, no m/r/g  CHEST: CTA b/l  ABD: +BS, soft, mild epigastric discomfort, no hepatosplenomegaly  EXT: no c/c/e  NEURO: aaox3  SKIN: NO rashes

## 2023-03-14 NOTE — PATIENT INSTRUCTIONS
Scheduled date of EGD/colonoscopy (as of today): 04/25/23  Physician performing EGD/colonoscopy: STERLING  [facility]  Desired bowel prep reviewed with patient: JAXSON HAGAN [facility]  Instructions reviewed with patient by: [place]  Clearances: NONE

## 2023-03-14 NOTE — LETTER
March 14, 2023     DO Lana Dele[address]  [address]  [facility]    Patient: Johnie Sacks   YOB: 1965   Date of Visit: 3/14/2023       Dear Dr Elida Monroe: Thank you for referring Johnie Sacks to me for evaluation  Below are my notes for this consultation  If you have questions, please do not hesitate to call me  I look forward to following your patient along with you  Sincerely,        Darshana Leach MD        CC: No Recipients  Darshana Leach MD  3/14/2023  4:13 PM  Sign when Signing Visit  Consultation - [address] [facility]  Johnie Sacks 59 y o  female MRN: [id number]          Assessment & Plan:  55-year-old female, several years of epigastric, left upper quadrant pain, chronic constipation  1   Epigastric pain: Suspect peptic ulcer disease versus reflux, she had apparently reflux confirmed on outside barium study  -Recommended daily PPI therapy  -Recommend proceeding with an upper endoscopy to evaluate for any significant reflux esophagitis, peptic ulcer disease    2  Left upper quadrant pain: Most likely secondary to chronic constipation  -We will check an x-ray of her abdomen pelvis  -Pending results of x-ray, can consider daily MiraLAX    3 colon cancer screening: Patient has never had a colonoscopy  -Recommend proceeding with a colonoscopy to exclude luminal pathology  -Discussed with the risks of procedures including bleeding, surgery, perforation, missed bowel detection rate    Lidya was seen today for abdominal pain  Diagnoses and all orders for this visit:    Other constipation    Epigastric abdominal pain  -     XR abdomen 1 view kub; Future  -     EGD; Future  -     pantoprazole (PROTONIX) 40 mg tablet; Take 1 tablet (40 mg total) by mouth daily    Left upper quadrant pain  -     XR abdomen 1 view kub; Future    Colon cancer screening  -     Colonoscopy;  Future    Other orders  -     Diet NPO; Sips with meds; Standing  -     Void on call to OR; Standing            _____________________________________________________________        CC: Epigastric and left upper quadrant pain    HPI:  Dean Rios is a 59 y  o female who was referred for evaluation of epigastric and left upper quadrant pain visit  This is a 59-year-old female, has a history of chronic constipation with bowel movements every 2 to 4 days, she reports that she wakes up in the morning with epigastric discomfort, better after eating, she also has left upper quadrant pain  We had seen her in the past for similar symptoms at that time recommended colonoscopy, the patient was nervous and did not proceed with colonoscopy and has not been seen since then  She did quit smoking last couple years  She has gained a few pounds  She notes that sometimes her abdominal pains tend to get worse with bowel movements  She denies any nausea, vomiting and as noted above some of the pain is actually better with eating  She does report some indigestion, was seen by ENT for burning in her throat  At that time she had a barium study at outside institution which demonstrated reflux but was not placed on any medications  She denies any dysphagia  Her weight has been stable  She reports some abdominal bloating in general     She takes Tylenol regularly but denies any NSAIDs  Medical history is otherwise unremarkable  Surgical history is notable for breast biopsy  She quit smoking, denies any alcohol complication retired from [employer]  Prior family history is noted for breast cancer in 2 sisters, brain cancer in her mother  She has difficulty with sleep  ROS:  The remainder of the ROS was negative except for the pertinent positives mentioned in HPI  Allergies: Patient has no known allergies      Medications:   Current Outpatient Medications:   •  ALPRAZolam (XANAX) 1 mg tablet, 1 mg 2 (two) times a day, Disp: , Rfl:   •  latanoprost (XALATAN) 0 005 % ophthalmic solution, INSTILL 1 DROP INTO BOTH EYES AT BEDTIME, Disp: , Rfl:   •  methocarbamol (ROBAXIN) 750 mg tablet, Take 1 tablet (750 mg total) by mouth daily at bedtime as needed for muscle spasms, Disp: 30 tablet, Rfl: 2  •  pantoprazole (PROTONIX) 40 mg tablet, Take 1 tablet (40 mg total) by mouth daily, Disp: 30 tablet, Rfl: 3'    Past Medical History:   Diagnosis Date   • Acid reflux    • Insomnia        Past Surgical History:   Procedure Laterality Date   • MAMMO STEREOTACTIC BREAST BIOPSY RIGHT (ALL INC) Right 3/9/2023       Family History   Problem Relation Age of Onset   • Brain cancer Mother 40   • No Known Problems Father    • Depression Sister 48   • Stroke Sister    • Breast cancer Sister 39        breast   • AVM Sister    • No Known Problems Sister    • No Known Problems Brother    • No Known Problems Son    • Breast cancer Half-Sister         unknown age        reports that she quit smoking about 12 months ago  Her smoking use included cigarettes  She has a 60 00 pack-year smoking history  She has never used smokeless tobacco  She reports that she does not currently use alcohol  She reports current drug use  Drug: Marijuana            Physical Exam:     /77 (BP Location: Left arm, Patient Position: Sitting, Cuff Size: Standard)   Pulse 78   Ht 5' 3" (1 6 m)   Wt 66 2 kg (146 lb)   SpO2 98%   BMI 25 86 kg/m²     Gen: wn/wd, NAD, healthy-appearing female  HEENT: anicteric, MMM, no cervical LAD  CVS: RRR, no m/r/g  CHEST: CTA b/l  ABD: +BS, soft, mild epigastric discomfort, no hepatosplenomegaly  EXT: no c/c/e  NEURO: aaox3  SKIN: NO rashes

## 2023-03-15 ENCOUNTER — HOSPITAL ENCOUNTER (OUTPATIENT)
Dept: RADIOLOGY | Facility: HOSPITAL | Age: 58
Discharge: HOME | End: 2023-03-15

## 2023-03-15 ENCOUNTER — HOSPITAL ENCOUNTER (OUTPATIENT)
Dept: MRI IMAGING | Facility: HOSPITAL | Age: 58
Discharge: HOME | End: 2023-03-15

## 2023-03-15 DIAGNOSIS — R10.12 LEFT UPPER QUADRANT PAIN: ICD-10-CM

## 2023-03-15 DIAGNOSIS — M51.16 INTERVERTEBRAL DISC DISORDER WITH RADICULOPATHY OF LUMBAR REGION: ICD-10-CM

## 2023-03-15 DIAGNOSIS — M48.062 LUMBAR STENOSIS WITH NEUROGENIC CLAUDICATION: ICD-10-CM

## 2023-03-15 DIAGNOSIS — R10.13 EPIGASTRIC ABDOMINAL PAIN: ICD-10-CM

## 2023-03-21 ENCOUNTER — TELEPHONE (OUTPATIENT)
Dept: PAIN MEDICINE | Facility: CLINIC | Age: 58
End: 2023-03-21

## 2023-03-21 DIAGNOSIS — M51.16 INTERVERTEBRAL DISC DISORDER WITH RADICULOPATHY OF LUMBAR REGION: Primary | ICD-10-CM

## 2023-03-21 DIAGNOSIS — M79.18 MYOFASCIAL PAIN SYNDROME: ICD-10-CM

## 2023-03-21 RX ORDER — METHOCARBAMOL 750 MG/1
750 TABLET, FILM COATED ORAL 2 TIMES DAILY PRN
Qty: 60 TABLET | Refills: 2 | Status: SHIPPED | OUTPATIENT
Start: 2023-03-21 | End: 2023-06-19

## 2023-03-21 NOTE — TELEPHONE ENCOUNTER
Per pt request, left detailed vm advising the same  CB # provided for any further questions or concerns

## 2023-03-21 NOTE — TELEPHONE ENCOUNTER
S/w pt, advised of the same  Pt verbalized understanding and appreciation  Pt confirmed she is still symptomatic at this time, but declined injection as she has had inj in the past without relief  Any other recommendations at this time? Thank you

## 2023-03-21 NOTE — TELEPHONE ENCOUNTER
Please let patient know that MRI lumbar spine shows disc bulging and arthritis at the L4-5 and L5-S1 levels with mild central narrowing and moderate foraminal narrowing  X-rays of the hips also showed some mild left hip osteoarthritis  If still symptomatic Cisek suspect symptoms are emanating from the L4-5 and L5-S1 levels    If still symptomatic, would recommend proceeding with left-sided L4-5 transforaminal epidural steroid injection

## 2023-03-21 NOTE — TELEPHONE ENCOUNTER
Okay to increase the methocarbamol  750 mg to twice daily dosing  New prescription sent to her pharmacy      PT order placed and yes I do think exercise will be helpful for the symptoms that she is experiencing

## 2023-03-21 NOTE — TELEPHONE ENCOUNTER
S/w pt, advised of the same  Pt verbalized understanding and agreeable to PT  Please place order, thank you  Pt also asked if she would be able to increase methocarbamol to BID dosing? States it helps pain at night and does not make her drowsy  Pt would like to know if exercise will realistically be beneficial for her symptoms considering xray results  Please advise, thank you  Per pt ok to leave detailed vm

## 2023-04-05 DIAGNOSIS — R10.13 EPIGASTRIC ABDOMINAL PAIN: ICD-10-CM

## 2023-04-05 RX ORDER — PANTOPRAZOLE SODIUM 40 MG/1
TABLET, DELAYED RELEASE ORAL
Qty: 90 TABLET | Refills: 2 | Status: SHIPPED | OUTPATIENT
Start: 2023-04-05

## 2023-05-13 PROBLEM — Z12.11 COLON CANCER SCREENING: Status: RESOLVED | Noted: 2023-03-14 | Resolved: 2023-05-13

## 2023-06-14 ENCOUNTER — TELEPHONE (OUTPATIENT)
Dept: GASTROENTEROLOGY | Facility: CLINIC | Age: 58
End: 2023-06-14

## 2023-06-14 NOTE — TELEPHONE ENCOUNTER
Scheduled date of EGD/colonoscopy (as of today): 08/15/2023  Physician performing EGD/colonoscopy:Dr Banegas  Location of EGD/colonoscopy:[facility]  Clearances: n/a

## 2023-08-11 ENCOUNTER — TELEPHONE (OUTPATIENT)
Age: 58
End: 2023-08-11

## 2023-08-11 NOTE — TELEPHONE ENCOUNTER
Scheduled date of EGD/colonoscopy (as of today):11/20/2023  Physician performing EGD/colonoscopy:dr. Khan Kidney  Location of EGD/colonoscopy:[facility]      Pt rescheduled due to having a broken foot.

## 2023-11-16 ENCOUNTER — TELEPHONE (OUTPATIENT)
Age: 58
End: 2023-11-16

## 2023-11-16 NOTE — TELEPHONE ENCOUNTER
Caller: maurisio Bose    Doctor: Megan Rhodes    Reason for call: please fax pt's Physical therapy referral to [phone]    Call back#: [phone]

## 2023-11-20 RX ORDER — SODIUM CHLORIDE, SODIUM LACTATE, POTASSIUM CHLORIDE, CALCIUM CHLORIDE 600; 310; 30; 20 MG/100ML; MG/100ML; MG/100ML; MG/100ML
125 INJECTION, SOLUTION INTRAVENOUS CONTINUOUS
OUTPATIENT
Start: 2023-11-20

## 2024-01-07 DIAGNOSIS — R10.13 EPIGASTRIC ABDOMINAL PAIN: ICD-10-CM

## 2024-01-08 RX ORDER — PANTOPRAZOLE SODIUM 40 MG/1
TABLET, DELAYED RELEASE ORAL
Qty: 90 TABLET | Refills: 1 | Status: SHIPPED | OUTPATIENT
Start: 2024-01-08

## 2024-05-10 ENCOUNTER — OFFICE VISIT (OUTPATIENT)
Dept: URGENT CARE | Age: 59
End: 2024-05-10
Payer: COMMERCIAL

## 2024-05-10 VITALS
OXYGEN SATURATION: 98 % | DIASTOLIC BLOOD PRESSURE: 60 MMHG | RESPIRATION RATE: 20 BRPM | BODY MASS INDEX: 26.75 KG/M2 | HEART RATE: 61 BPM | TEMPERATURE: 98.4 F | SYSTOLIC BLOOD PRESSURE: 102 MMHG | WEIGHT: 151 LBS

## 2024-05-10 DIAGNOSIS — R35.0 URINARY FREQUENCY: Primary | ICD-10-CM

## 2024-05-10 LAB
SL AMB  POCT GLUCOSE, UA: NEGATIVE
SL AMB LEUKOCYTE ESTERASE,UA: ABNORMAL
SL AMB POCT BLOOD,UA: ABNORMAL
SL AMB POCT COLOR,UA: ABNORMAL
SL AMB POCT SPECIFIC GRAVITY,UA: 1.01

## 2024-05-10 PROCEDURE — 81002 URINALYSIS NONAUTO W/O SCOPE: CPT

## 2024-05-10 PROCEDURE — 87186 SC STD MICRODIL/AGAR DIL: CPT

## 2024-05-10 PROCEDURE — G0382 LEV 3 HOSP TYPE B ED VISIT: HCPCS

## 2024-05-10 PROCEDURE — 87077 CULTURE AEROBIC IDENTIFY: CPT

## 2024-05-10 PROCEDURE — 87086 URINE CULTURE/COLONY COUNT: CPT

## 2024-05-10 PROCEDURE — S9083 URGENT CARE CENTER GLOBAL: HCPCS

## 2024-05-10 RX ORDER — CEPHALEXIN 500 MG/1
500 CAPSULE ORAL EVERY 8 HOURS SCHEDULED
Qty: 15 CAPSULE | Refills: 0 | Status: SHIPPED | OUTPATIENT
Start: 2024-05-10 | End: 2024-05-15

## 2024-05-10 NOTE — PATIENT INSTRUCTIONS
Urine dip performed in office suggests possible infection, urine culture results pending and should be available in MyChart within 48-72 hours.   Please begin antibiotics as directed.   Stay well hydrated, may use Azo or cranberry juice products as needed for symptoms relief.   Follow up with PCP if no relief within 3-5 days.   Go to ED for fever or flank pain.

## 2024-05-10 NOTE — PROGRESS NOTES
[facility]        NAME: Lidya Fan is a 59 y.o. female  : 1965    MRN: [id number]  DATE: May 10, 2024  TIME: 6:57 PM    Assessment and Plan   Urinary frequency [R35.0]  1. Urinary frequency  POCT urine dip    Urine culture    cephalexin (KEFLEX) 500 mg capsule      Urine dip performed in office suggests possible infection, urine culture results pending and [facility] 48-72 hours.   Please begin antibiotics as directed.   Stay well hydrated, may use Azo or cranberry juice products as needed for symptoms relief.   Follow up with PCP if no relief within 3-5 days.   Go to ED for fever or flank pain.        Patient Instructions   Urinary Urgency and Frequency   AMBULATORY CARE:   Urinary urgency and frequency  is a condition that increases how strongly or how often you need to urinate. The condition may also be called urgency-frequency syndrome. Urinary urgency means you feel such a strong need to urinate that you have trouble waiting. You may also feel discomfort in your bladder. Urinary frequency means you need to urinate many times during the day. This may also be called increased daytime frequency. You may be woken from sleep by the need to urinate. Urgency and frequency often happen together, but you may only have one.         Contact your healthcare provider if:   Your urine is pink, or you notice blood in your urine.      You have pain with urination.      You continue to have symptoms even after you take your medicine.      You have new or worsening symptoms.     You have questions or concerns about your condition or care.     Treatment  will depend on the type and cause of your urination problems. You may need any of the following:  Medicines  may be given to relax your bladder and decrease urination. You may also need antibiotics if your symptoms are caused by a bacterial infection.     Sacral nerve stimulation  sends electrical signals to your sacral nerve through  a small device implanted under your skin. Your sacral nerve controls your bladder, sphincter, and pelvic floor muscles.      Botox injections  into your bladder may help relax your bladder muscle to decrease urgency and frequency.     Surgery  may be done if all other treatments cannot help you control your bladder.     Manage urinary urgency and frequency:   Keep a record of your urination patterns for a few days.  Write down the number of times you urinate over 24 hours, the amount, and if you have urine leakage. Record how strong the urge to urinate was each time. Your healthcare provider may also want you to record the type and amount of liquids you drink.      Train your bladder.  Go to the bathroom at set times, such as every 2 hours, even if you do not feel the urge to go. You can also try to hold your urine when you feel the urge to go. For example, hold your urine for 5 minutes when you feel the urge to go. As that becomes easier, hold your urine for 10 minutes. Work up to every 3 or 4 hours to help control your bladder.     Limit liquids as directed.  Limit liquids to decrease the amount you urinate. Ask how much liquid to drink each day and which liquids are best for you. You may need to avoid drinking liquids several hours before you go to sleep. Your healthcare provider may also recommend that you limit caffeine and alcohol.      Do Kegel exercises often.  Kegel exercises help strengthen your pelvic muscles and improve bladder control. These muscles help you stop urinating. Squeeze these muscles tightly for 5 seconds like you are trying to stop the flow of urine. Then relax for 5 seconds. Gradually work up to squeezing for 10 seconds. Do 3 sets of 15 repetitions a day, or as directed.     Exercise regularly and maintain a healthy weight.  Ask your healthcare provider how much you should weigh and about the best exercise plan for you. Extra weight puts pressure on your bladder and may make your symptoms  worse. Ask your provider to help you create a safe weight loss plan if you are overweight.     Follow up with your healthcare provider as directed:  Your healthcare provider may refer you to a specialist to find the cause of your symptoms. Write down your questions so you remember to ask them during your visits.  © Copyright [employer] 2023 Information is for End User's use only and may not be sold, redistributed or otherwise used for commercial purposes.  The above information is an  only. It is not intended as medical advice for individual conditions or treatments. Talk to your doctor, nurse or pharmacist before following any medical regimen to see if it is safe and effective for you.       Follow up with PCP in 3-5 days.  Proceed to  ER if symptoms worsen.    Chief Complaint     Chief Complaint   Patient presents with    Possible UTI     Started with symptoms on Wed. Frequency, urgency and burning. No lower back or pelvic pain . No fevers reported         History of Present Illness       Urinary Frequency   This is a new problem. The current episode started in the past 7 days (x 2 days). The problem occurs intermittently. The problem has been unchanged. The quality of the pain is described as burning. The pain is mild. There has been no fever. Sexually active: Denies chance of STI. There is No history of pyelonephritis. Associated symptoms include frequency and urgency. Pertinent negatives include no chills, discharge, flank pain, hematuria, hesitancy, nausea, possible pregnancy, sweats or vomiting. She has tried nothing for the symptoms. The treatment provided no relief. There is no history of catheterization, kidney stones, recurrent UTIs, a single kidney, urinary stasis or a urological procedure.       Review of Systems   Review of Systems   Constitutional:  Negative for chills, fatigue and fever.   HENT:  Negative for congestion, ear discharge, ear pain, postnasal drip, rhinorrhea, sinus  pressure, sinus pain, sneezing and sore throat.    Eyes: Negative.  Negative for pain, discharge, redness and itching.   Respiratory: Negative.  Negative for apnea, cough, choking, chest tightness, shortness of breath, wheezing and stridor.    Cardiovascular: Negative.  Negative for chest pain and palpitations.   Gastrointestinal: Negative.  Negative for diarrhea, nausea and vomiting.   Endocrine: Negative.  Negative for polydipsia, polyphagia and polyuria.   Genitourinary:  Positive for frequency and urgency. Negative for decreased urine volume, difficulty urinating, dyspareunia, dysuria, enuresis, flank pain, genital sores, hematuria, hesitancy, menstrual problem, pelvic pain, vaginal bleeding, vaginal discharge and vaginal pain.   Musculoskeletal: Negative.  Negative for arthralgias, back pain, myalgias, neck pain and neck stiffness.   Skin: Negative.  Negative for color change and rash.   Allergic/Immunologic: Negative.  Negative for environmental allergies.   Neurological: Negative.  Negative for dizziness, facial asymmetry, light-headedness, numbness and headaches.   Hematological: Negative.  Negative for adenopathy.   Psychiatric/Behavioral: Negative.           Current Medications       Current Outpatient Medications:     ALPRAZolam (XANAX) 1 mg tablet, 1 mg 2 (two) times a day, Disp: , Rfl:     cephalexin (KEFLEX) 500 mg capsule, Take 1 capsule (500 mg total) by mouth every 8 (eight) hours for 5 days, Disp: 15 capsule, Rfl: 0    latanoprost (XALATAN) 0.005 % ophthalmic solution, INSTILL 1 DROP INTO BOTH EYES AT BEDTIME, Disp: , Rfl:     pantoprazole (PROTONIX) 40 mg tablet, TAKE 1 TABLET BY MOUTH EVERY DAY, Disp: 90 tablet, Rfl: 1    methocarbamol (ROBAXIN) 750 mg tablet, Take 1 tablet (750 mg total) by mouth 2 (two) times a day as needed for muscle spasms, Disp: 60 tablet, Rfl: 2    Current Allergies     Allergies as of 05/10/2024    (No Known Allergies)            The following portions of the patient's  history were reviewed and updated as appropriate: allergies, current medications, past family history, past medical history, past social history, past surgical history and problem list.     Past Medical History:   Diagnosis Date    Acid reflux     Foot fracture 08/2023    no surgery, only cast and PT.    Insomnia        Past Surgical History:   Procedure Laterality Date    MAMMO STEREOTACTIC BREAST BIOPSY RIGHT (ALL INC) Right 03/09/2023       Family History   Problem Relation Age of Onset    Brain cancer Mother 44    No Known Problems Father     Depression Sister 50    Stroke Sister     Breast cancer Sister 45        breast    AVM Sister     No Known Problems Sister     No Known Problems Brother     No Known Problems Son     Breast cancer Half-Sister         unknown age         Medications have been verified.        Objective   /60   Pulse 61   Temp 98.4 °F (36.9 °C) (Tympanic)   Resp 20   Wt 68.5 kg (151 lb)   SpO2 98%   BMI 26.75 kg/m²        Physical Exam     Physical Exam  Vitals and nursing note reviewed.   Constitutional:       General: She is not in acute distress.     Appearance: Normal appearance. She is not ill-appearing, toxic-appearing or diaphoretic.      Interventions: She is not intubated.  HENT:      Head: Normocephalic and atraumatic.      Right Ear: External ear normal.      Left Ear: External ear normal.      Nose: Nose normal. No congestion or rhinorrhea.      Mouth/Throat:      Mouth: Mucous membranes are moist.   Eyes:      Extraocular Movements: Extraocular movements intact.      Conjunctiva/sclera: Conjunctivae normal.      Pupils: Pupils are equal, round, and reactive to light.   Cardiovascular:      Rate and Rhythm: Normal rate and regular rhythm.      Pulses: Normal pulses.      Heart sounds: Normal heart sounds, S1 normal and S2 normal. Heart sounds not distant. No murmur heard.     No friction rub. No gallop.   Pulmonary:      Effort: Pulmonary effort is normal. No  tachypnea, bradypnea, accessory muscle usage, prolonged expiration, respiratory distress or retractions. She is not intubated.      Breath sounds: Normal breath sounds. No stridor, decreased air movement or transmitted upper airway sounds. No decreased breath sounds, wheezing, rhonchi or rales.   Abdominal:      General: Abdomen is flat. Bowel sounds are normal.      Palpations: Abdomen is soft.      Tenderness: There is no abdominal tenderness. There is no right CVA tenderness, left CVA tenderness, guarding or rebound.   Musculoskeletal:         General: Normal range of motion.      Cervical back: Normal range of motion and neck supple. No tenderness.   Skin:     General: Skin is warm and dry.      Capillary Refill: Capillary refill takes less than 2 seconds.   Neurological:      General: No focal deficit present.      Mental Status: She is alert and oriented to person, place, and time.      Cranial Nerves: No cranial nerve deficit.   Psychiatric:         Mood and Affect: Mood normal.         Behavior: Behavior normal.

## 2024-05-12 LAB
BACTERIA UR CULT: ABNORMAL
BACTERIA UR CULT: ABNORMAL

## 2024-12-13 ENCOUNTER — OFFICE VISIT (OUTPATIENT)
Dept: FAMILY MEDICINE CLINIC | Facility: CLINIC | Age: 59
End: 2024-12-13
Payer: COMMERCIAL

## 2024-12-13 VITALS
WEIGHT: 146 LBS | TEMPERATURE: 98.5 F | HEIGHT: 62 IN | OXYGEN SATURATION: 98 % | RESPIRATION RATE: 16 BRPM | DIASTOLIC BLOOD PRESSURE: 70 MMHG | HEART RATE: 105 BPM | BODY MASS INDEX: 26.87 KG/M2 | SYSTOLIC BLOOD PRESSURE: 110 MMHG

## 2024-12-13 DIAGNOSIS — Z12.2 SCREENING FOR LUNG CANCER: ICD-10-CM

## 2024-12-13 DIAGNOSIS — F51.01 PRIMARY INSOMNIA: ICD-10-CM

## 2024-12-13 DIAGNOSIS — Z12.31 ENCOUNTER FOR SCREENING MAMMOGRAM FOR MALIGNANT NEOPLASM OF BREAST: ICD-10-CM

## 2024-12-13 DIAGNOSIS — Z00.00 ANNUAL PHYSICAL EXAM: Primary | ICD-10-CM

## 2024-12-13 DIAGNOSIS — Z23 ENCOUNTER FOR IMMUNIZATION: ICD-10-CM

## 2024-12-13 DIAGNOSIS — R22.1 NECK MASS: ICD-10-CM

## 2024-12-13 DIAGNOSIS — Z12.11 SCREENING FOR COLON CANCER: ICD-10-CM

## 2024-12-13 PROCEDURE — 90673 RIV3 VACCINE NO PRESERV IM: CPT | Performed by: FAMILY MEDICINE

## 2024-12-13 PROCEDURE — 99214 OFFICE O/P EST MOD 30 MIN: CPT | Performed by: FAMILY MEDICINE

## 2024-12-13 PROCEDURE — 90471 IMMUNIZATION ADMIN: CPT | Performed by: FAMILY MEDICINE

## 2024-12-13 PROCEDURE — 99396 PREV VISIT EST AGE 40-64: CPT | Performed by: FAMILY MEDICINE

## 2024-12-13 RX ORDER — ALPRAZOLAM 0.25 MG/1
0.25 TABLET ORAL
Qty: 30 TABLET | Refills: 1 | Status: SHIPPED | OUTPATIENT
Start: 2024-12-13

## 2024-12-13 NOTE — PROGRESS NOTES
Name: Lidya Fan      : 1965      MRN: [id number]  Encounter Provider: Sari Galan DO  Encounter Date: 2024   Encounter department: [facility]  :  Assessment & Plan  Annual physical exam  Check labs  Flu shot today  Obtain mammogram, CT chest  Complete cologuard testing  Healthy diet and exercise  Orders:    CBC and differential; Future    Comprehensive metabolic panel; Future    Lipid panel; Future    TSH, 3rd generation; Future    Encounter for immunization    Orders:    influenza vaccine, recombinant, PF, 0.5 mL IM (Flublok)    Encounter for screening mammogram for malignant neoplasm of breast    Orders:    Mammo screening bilateral w 3d and cad; Future    Screening for colon cancer    Orders:    Cologuard    Screening for lung cancer  I discussed with her that she is a candidate for lung cancer CT screening.     The following Shared Decision-Making points were covered:  Benefits of screening were discussed, including the rates of reduction in death from lung cancer and other causes.  Harms of screening were reviewed, including false positive tests, radiation exposure levels, risks of invasive procedures, risks of complications of screening, and risk of overdiagnosis.  I counseled on the importance of adherence to annual lung cancer LDCT screening, impact of co-morbidities, and ability or willingness to undergo diagnosis and treatment.  I counseled on the importance of maintaining abstinence as a former smoker or was counseled on the importance of smoking cessation if a current smoker    Review of Eligibility Criteria: She meets all of the criteria for Lung Cancer Screening.   She is 59 y.o.   She has 20 pack year tobacco history and is a current smoker or has quit within the past 15 years  She presents no signs or symptoms of lung cancer    After discussion, the patient decided to elect lung cancer screening.    Orders:    CT lung screening program; Future    Primary  insomnia  Decrease xanax to 0.25mg nightly x 2 mo, then off  Call if she has difficulty with sleep--t/c trazodone  Orders:    ALPRAZolam (XANAX) 0.25 mg tablet; Take 1 tablet (0.25 mg total) by mouth daily at bedtime as needed for anxiety    Neck mass  Suspect benign node, but given chronicity, check US  Orders:    US head neck soft tissue; Future           History of Present Illness     HPI  Pt presents for physical exam.  Preventively, due for labs, lung cancer screening, mammogram, cologuard, flu shot.  Tries to exercise and eat well.  Taking care of her sister who has lymphoma.    From a problem standpoint, pt c/o a year of tenderness in the neck area.  States she saw ENT but they didn't look at her neck.  With her sister's illness, she is more concerned.  No unintentional weight loss.  No nightsweats.      Pt has been getting xanax nightly for sleep from [facility] in [place] and really wants to wean.  She's currently taking xanax 0.5mg nightly.  Interested in how to wean  Review of Systems   Constitutional:  Negative for chills, fatigue, fever and unexpected weight change.   HENT:  Negative for congestion, ear pain, hearing loss, postnasal drip, rhinorrhea, sinus pressure, sinus pain, sore throat, trouble swallowing and voice change.    Eyes:  Negative for pain, redness and visual disturbance.   Respiratory:  Negative for cough and shortness of breath.    Cardiovascular:  Negative for chest pain, palpitations and leg swelling.   Gastrointestinal:  Negative for abdominal pain, constipation, diarrhea and nausea.   Endocrine: Negative for cold intolerance, heat intolerance, polydipsia and polyuria.   Genitourinary:  Negative for dysuria, frequency and urgency.   Musculoskeletal:  Negative for arthralgias, joint swelling and myalgias.   Skin:  Negative for rash.        No suspicious lesions   Neurological:  Negative for weakness, numbness and headaches.   Hematological:  Negative for adenopathy.       Objective   /70  "(BP Location: Left arm, Patient Position: Sitting, Cuff Size: Standard)   Pulse 105   Temp 98.5 °F (36.9 °C) (Tympanic)   Resp 16   Ht 5' 2\" (1.575 m)   Wt 66.2 kg (146 lb)   SpO2 98%   BMI 26.70 kg/m²      Physical Exam  Constitutional:       Appearance: Normal appearance.   HENT:      Head: Normocephalic and atraumatic.      Right Ear: Tympanic membrane, ear canal and external ear normal.      Left Ear: Tympanic membrane, ear canal and external ear normal.      Nose: Nose normal. No congestion.      Mouth/Throat:      Mouth: Mucous membranes are moist.      Pharynx: No oropharyngeal exudate or posterior oropharyngeal erythema.   Eyes:      Extraocular Movements: Extraocular movements intact.      Conjunctiva/sclera: Conjunctivae normal.      Pupils: Pupils are equal, round, and reactive to light.   Neck:      Vascular: No carotid bruit.      Comments: +L anterior cervical node which is tender and unenlarged  Cardiovascular:      Rate and Rhythm: Normal rate and regular rhythm.      Heart sounds: No murmur heard.     No friction rub. No gallop.   Pulmonary:      Effort: Pulmonary effort is normal.      Breath sounds: No wheezing, rhonchi or rales.   Abdominal:      General: Abdomen is flat. There is no distension.      Palpations: Abdomen is soft.      Tenderness: There is no abdominal tenderness.   Musculoskeletal:      Cervical back: Neck supple.   Lymphadenopathy:      Cervical: No cervical adenopathy.   Neurological:      General: No focal deficit present.      Mental Status: She is alert and oriented to person, place, and time.      Cranial Nerves: No cranial nerve deficit.      Motor: No weakness.      Deep Tendon Reflexes: Reflexes normal.         "

## 2024-12-13 NOTE — PATIENT INSTRUCTIONS
0.25mg xanax nightly x 2 months, then off  Obtain fasting labs  Obtain US neck  Obtain mammogram and lung cancer screening

## 2024-12-20 ENCOUNTER — HOSPITAL ENCOUNTER (OUTPATIENT)
Dept: RADIOLOGY | Facility: MEDICAL CENTER | Age: 59
Discharge: HOME | End: 2024-12-20
Payer: COMMERCIAL

## 2024-12-20 VITALS — WEIGHT: 146 LBS | HEIGHT: 62 IN | BODY MASS INDEX: 26.87 KG/M2

## 2024-12-20 DIAGNOSIS — Z12.31 ENCOUNTER FOR SCREENING MAMMOGRAM FOR MALIGNANT NEOPLASM OF BREAST: ICD-10-CM

## 2024-12-20 PROCEDURE — 77063 BREAST TOMOSYNTHESIS BI: CPT

## 2024-12-20 PROCEDURE — 77067 SCR MAMMO BI INCL CAD: CPT

## 2024-12-23 ENCOUNTER — HOSPITAL ENCOUNTER (OUTPATIENT)
Dept: RADIOLOGY | Facility: MEDICAL CENTER | Age: 59
Discharge: HOME | End: 2024-12-23
Payer: COMMERCIAL

## 2024-12-23 ENCOUNTER — RESULTS FOLLOW-UP (OUTPATIENT)
Dept: FAMILY MEDICINE CLINIC | Facility: CLINIC | Age: 59
End: 2024-12-23

## 2024-12-23 DIAGNOSIS — R22.1 NECK MASS: ICD-10-CM

## 2024-12-23 PROCEDURE — 76536 US EXAM OF HEAD AND NECK: CPT

## 2024-12-31 ENCOUNTER — TELEPHONE (OUTPATIENT)
Age: 59
End: 2024-12-31

## 2024-12-31 NOTE — TELEPHONE ENCOUNTER
Contacted radiology. Was informed that image has not been read yet because it was completed on 12/23/24. Was provided a number to contact to request them to read sooner, but number was not valid. Contacted [phone], Nabila answered, stated that the imaging was not ordered STAT. She stated that radiology is about a week behind.

## 2025-05-28 ENCOUNTER — OFFICE VISIT (OUTPATIENT)
Dept: FAMILY MEDICINE CLINIC | Facility: CLINIC | Age: 60
End: 2025-05-28
Payer: COMMERCIAL

## 2025-05-28 VITALS
BODY MASS INDEX: 25.61 KG/M2 | DIASTOLIC BLOOD PRESSURE: 70 MMHG | OXYGEN SATURATION: 99 % | RESPIRATION RATE: 16 BRPM | HEART RATE: 78 BPM | WEIGHT: 140 LBS | SYSTOLIC BLOOD PRESSURE: 90 MMHG | TEMPERATURE: 97.4 F

## 2025-05-28 DIAGNOSIS — F51.01 PRIMARY INSOMNIA: Primary | ICD-10-CM

## 2025-05-28 DIAGNOSIS — Z12.2 SCREENING FOR LUNG CANCER: ICD-10-CM

## 2025-05-28 PROCEDURE — 99213 OFFICE O/P EST LOW 20 MIN: CPT | Performed by: FAMILY MEDICINE

## 2025-05-28 RX ORDER — GABAPENTIN 100 MG/1
100 CAPSULE ORAL
Qty: 30 CAPSULE | Refills: 5 | Status: SHIPPED | OUTPATIENT
Start: 2025-05-28

## 2025-05-29 NOTE — PROGRESS NOTES
Name: Lidya Fan      : 1965      MRN: [id number]  Encounter Provider: Sari Galan DO  Encounter Date: 2025   Encounter department: [facility]  :  Assessment & Plan  Primary insomnia  Discussed options  Start neurontin qHS and see if this is helpful    Orders:    gabapentin (NEURONTIN) 100 mg capsule; Take 1 capsule (100 mg total) by mouth daily at bedtime    Screening for lung cancer  I discussed with her that she is a candidate for lung cancer CT screening.     The following Shared Decision-Making points were covered:  Benefits of screening were discussed, including the rates of reduction in death from lung cancer and other causes.  Harms of screening were reviewed, including false positive tests, radiation exposure levels, risks of invasive procedures, risks of complications of screening, and risk of overdiagnosis.  I counseled on the importance of adherence to annual lung cancer LDCT screening, impact of co-morbidities, and ability or willingness to undergo diagnosis and treatment.  I counseled on the importance of maintaining abstinence as a former smoker or was counseled on the importance of smoking cessation if a current smoker    Review of Eligibility Criteria: She meets all of the criteria for Lung Cancer Screening.   She is 60 y.o.   She has 20 pack year tobacco history and is a current smoker or has quit within the past 15 years  She presents no signs or symptoms of lung cancer    After discussion, the patient decided to elect lung cancer screening.    Orders:    CT Lung Screening Program; Future           History of Present Illness   HPI  Pt presents in f/u.  She is due for cologuard, lung cancer screening.  Due fo rlabs    Pt's only issue is difficulty obtaining sleep.  Okay in maintaining sleep.  Using xanax nightly.  Trazodone and melatonin unhelpful.    Review of Systems   Constitutional:  Negative for chills, fatigue, fever and unexpected weight change.    HENT:  Negative for congestion, ear pain, hearing loss, postnasal drip, rhinorrhea, sinus pressure, sinus pain, sore throat, trouble swallowing and voice change.    Eyes:  Negative for pain, redness and visual disturbance.   Respiratory:  Negative for cough and shortness of breath.    Cardiovascular:  Negative for chest pain, palpitations and leg swelling.   Gastrointestinal:  Negative for abdominal pain, constipation, diarrhea and nausea.   Endocrine: Negative for cold intolerance, heat intolerance, polydipsia and polyuria.   Genitourinary:  Negative for dysuria, frequency and urgency.   Musculoskeletal:  Negative for arthralgias, joint swelling and myalgias.   Skin:  Negative for rash.        No suspicious lesions   Neurological:  Negative for weakness, numbness and headaches.   Hematological:  Negative for adenopathy.       Objective   BP 90/70   Pulse 78   Temp (!) 97.4 °F (36.3 °C)   Resp 16   Wt 63.5 kg (140 lb)   SpO2 99%   BMI 25.61 kg/m²      Physical Exam  Constitutional:       Appearance: Normal appearance.   HENT:      Head: Normocephalic and atraumatic.      Right Ear: Tympanic membrane, ear canal and external ear normal.      Left Ear: Tympanic membrane, ear canal and external ear normal.      Nose: Nose normal. No congestion.      Mouth/Throat:      Mouth: Mucous membranes are moist.      Pharynx: No oropharyngeal exudate or posterior oropharyngeal erythema.     Eyes:      Extraocular Movements: Extraocular movements intact.      Conjunctiva/sclera: Conjunctivae normal.      Pupils: Pupils are equal, round, and reactive to light.     Neck:      Vascular: No carotid bruit.     Cardiovascular:      Rate and Rhythm: Normal rate and regular rhythm.      Heart sounds: No murmur heard.     No friction rub. No gallop.   Pulmonary:      Effort: Pulmonary effort is normal.      Breath sounds: No wheezing, rhonchi or rales.   Abdominal:      General: Abdomen is flat. There is no distension.       Palpations: Abdomen is soft.      Tenderness: There is no abdominal tenderness.     Musculoskeletal:      Cervical back: Neck supple.   Lymphadenopathy:      Cervical: No cervical adenopathy.     Neurological:      General: No focal deficit present.      Mental Status: She is alert and oriented to person, place, and time.      Cranial Nerves: No cranial nerve deficit.      Motor: No weakness.      Deep Tendon Reflexes: Reflexes normal.

## 2025-06-09 ENCOUNTER — APPOINTMENT (OUTPATIENT)
Dept: LAB | Facility: MEDICAL CENTER | Age: 60
End: 2025-06-09
Payer: COMMERCIAL

## 2025-06-09 ENCOUNTER — HOSPITAL ENCOUNTER (OUTPATIENT)
Dept: RADIOLOGY | Facility: MEDICAL CENTER | Age: 60
Discharge: HOME | End: 2025-06-09
Attending: FAMILY MEDICINE
Payer: COMMERCIAL

## 2025-06-09 DIAGNOSIS — Z12.2 SCREENING FOR LUNG CANCER: ICD-10-CM

## 2025-06-09 DIAGNOSIS — Z00.00 ANNUAL PHYSICAL EXAM: ICD-10-CM

## 2025-06-09 LAB
ALBUMIN SERPL BCG-MCNC: 4.5 G/DL (ref 3.5–5)
ALP SERPL-CCNC: 86 U/L (ref 34–104)
ALT SERPL W P-5'-P-CCNC: 15 U/L (ref 7–52)
ANION GAP SERPL CALCULATED.3IONS-SCNC: 10 MMOL/L (ref 4–13)
AST SERPL W P-5'-P-CCNC: 18 U/L (ref 13–39)
BASOPHILS # BLD AUTO: 0.03 THOUSANDS/ÂΜL (ref 0–0.1)
BASOPHILS NFR BLD AUTO: 0 % (ref 0–1)
BILIRUB SERPL-MCNC: 0.62 MG/DL (ref 0.2–1)
BUN SERPL-MCNC: 14 MG/DL (ref 5–25)
CALCIUM SERPL-MCNC: 9.7 MG/DL (ref 8.4–10.2)
CHLORIDE SERPL-SCNC: 105 MMOL/L (ref 96–108)
CHOLEST SERPL-MCNC: 241 MG/DL (ref ?–200)
CO2 SERPL-SCNC: 27 MMOL/L (ref 21–32)
CREAT SERPL-MCNC: 0.81 MG/DL (ref 0.6–1.3)
EOSINOPHIL # BLD AUTO: 0.1 THOUSAND/ÂΜL (ref 0–0.61)
EOSINOPHIL NFR BLD AUTO: 1 % (ref 0–6)
ERYTHROCYTE [DISTWIDTH] IN BLOOD BY AUTOMATED COUNT: 12.7 % (ref 11.6–15.1)
GFR SERPL CREATININE-BSD FRML MDRD: 78 ML/MIN/1.73SQ M
GLUCOSE P FAST SERPL-MCNC: 89 MG/DL (ref 65–99)
HCT VFR BLD AUTO: 43 % (ref 34.8–46.1)
HDLC SERPL-MCNC: 49 MG/DL
HGB BLD-MCNC: 14.7 G/DL (ref 11.5–15.4)
IMM GRANULOCYTES # BLD AUTO: 0.03 THOUSAND/UL (ref 0–0.2)
IMM GRANULOCYTES NFR BLD AUTO: 0 % (ref 0–2)
LDLC SERPL CALC-MCNC: 158 MG/DL (ref 0–100)
LYMPHOCYTES # BLD AUTO: 3.46 THOUSANDS/ÂΜL (ref 0.6–4.47)
LYMPHOCYTES NFR BLD AUTO: 46 % (ref 14–44)
MCH RBC QN AUTO: 33.3 PG (ref 26.8–34.3)
MCHC RBC AUTO-ENTMCNC: 34.2 G/DL (ref 31.4–37.4)
MCV RBC AUTO: 98 FL (ref 82–98)
MONOCYTES # BLD AUTO: 0.46 THOUSAND/ÂΜL (ref 0.17–1.22)
MONOCYTES NFR BLD AUTO: 6 % (ref 4–12)
NEUTROPHILS # BLD AUTO: 3.53 THOUSANDS/ÂΜL (ref 1.85–7.62)
NEUTS SEG NFR BLD AUTO: 47 % (ref 43–75)
NONHDLC SERPL-MCNC: 192 MG/DL
NRBC BLD AUTO-RTO: 0 /100 WBCS
PLATELET # BLD AUTO: 364 THOUSANDS/UL (ref 149–390)
PMV BLD AUTO: 9.4 FL (ref 8.9–12.7)
POTASSIUM SERPL-SCNC: 4.5 MMOL/L (ref 3.5–5.3)
PROT SERPL-MCNC: 7.1 G/DL (ref 6.4–8.4)
RBC # BLD AUTO: 4.41 MILLION/UL (ref 3.81–5.12)
SODIUM SERPL-SCNC: 142 MMOL/L (ref 135–147)
TRIGL SERPL-MCNC: 170 MG/DL (ref ?–150)
TSH SERPL DL<=0.05 MIU/L-ACNC: 1.67 UIU/ML (ref 0.45–4.5)
WBC # BLD AUTO: 7.61 THOUSAND/UL (ref 4.31–10.16)

## 2025-06-09 PROCEDURE — 84443 ASSAY THYROID STIM HORMONE: CPT

## 2025-06-09 PROCEDURE — 85025 COMPLETE CBC W/AUTO DIFF WBC: CPT

## 2025-06-09 PROCEDURE — 80053 COMPREHEN METABOLIC PANEL: CPT

## 2025-06-09 PROCEDURE — 36415 COLL VENOUS BLD VENIPUNCTURE: CPT

## 2025-06-09 PROCEDURE — 80061 LIPID PANEL: CPT

## 2025-06-16 ENCOUNTER — RESULTS FOLLOW-UP (OUTPATIENT)
Dept: FAMILY MEDICINE CLINIC | Facility: CLINIC | Age: 60
End: 2025-06-16